# Patient Record
Sex: MALE | Race: WHITE | NOT HISPANIC OR LATINO | Employment: OTHER | URBAN - METROPOLITAN AREA
[De-identification: names, ages, dates, MRNs, and addresses within clinical notes are randomized per-mention and may not be internally consistent; named-entity substitution may affect disease eponyms.]

---

## 2017-08-07 ENCOUNTER — ALLSCRIPTS OFFICE VISIT (OUTPATIENT)
Dept: OTHER | Facility: OTHER | Age: 67
End: 2017-08-07

## 2017-08-07 ENCOUNTER — HOSPITAL ENCOUNTER (OUTPATIENT)
Dept: RADIOLOGY | Facility: HOSPITAL | Age: 67
Discharge: HOME | End: 2017-08-07
Attending: ORTHOPAEDIC SURGERY
Payer: COMMERCIAL

## 2017-08-07 DIAGNOSIS — M25.519 PAIN IN SHOULDER: ICD-10-CM

## 2017-08-07 PROCEDURE — 73030 X-RAY EXAM OF SHOULDER: CPT

## 2018-01-12 VITALS
DIASTOLIC BLOOD PRESSURE: 80 MMHG | BODY MASS INDEX: 33.82 KG/M2 | HEART RATE: 101 BPM | HEIGHT: 67 IN | SYSTOLIC BLOOD PRESSURE: 127 MMHG | WEIGHT: 215.5 LBS

## 2018-03-02 ENCOUNTER — TELEPHONE (OUTPATIENT)
Dept: OBGYN CLINIC | Facility: HOSPITAL | Age: 68
End: 2018-03-02
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
## 2018-03-05 NOTE — TELEPHONE ENCOUNTER
Spoke with Elsy Thomas and was advised to proceed with getting records to Dr mSooth Knight or Dr Mati Silva to review  [facility] [place] [address] left for her to return my call  Awaiting callback

## 2018-03-05 NOTE — TELEPHONE ENCOUNTER
I spoke with Sheila Parker from [facility] and she will work on getting patient's records couriered to our [place] office for Dr Juan Pereira to review asap  Sheila Parker advised that a tentative appt for 3/20 was made for this patient pending receipt of records  She verbalized understanding and will call our office with information on records

## 2018-03-13 NOTE — TELEPHONE ENCOUNTER
Sheila Parker from [facility] informed that records have not been received as of yet  Toña sent them over 1 week ago via   She will check with  and get back to us  She may have to resend records  Toña's contact number is [phone]

## 2018-03-14 ENCOUNTER — TELEPHONE (OUTPATIENT)
Dept: OBGYN CLINIC | Facility: HOSPITAL | Age: 68
End: 2018-03-14

## 2018-03-14 NOTE — TELEPHONE ENCOUNTER
Patient coming in for appt on 3/20 with Dr Viktoriya Slater  Patient also has an L2 compression Fx  Neurosurgery said that patient does not need to follow up with them for this  They are wondering if Dr Viktoriya Slater can see him for this as well  Please advise

## 2018-03-19 ENCOUNTER — TELEPHONE (OUTPATIENT)
Dept: PAIN MEDICINE | Facility: MEDICAL CENTER | Age: 68
End: 2018-03-19

## 2018-03-19 NOTE — TELEPHONE ENCOUNTER
Haleigh Albarran from [facility] called to set up pt for a  consult  Intake done waiting on order either  being faxed to Mame Forrest or uploaded in epic  and waiting to see if pt saw previous pain management  Pt was in therapy and дмитрий was not sure    # [phone]

## 2018-03-20 ENCOUNTER — OFFICE VISIT (OUTPATIENT)
Dept: OBGYN CLINIC | Facility: HOSPITAL | Age: 68
End: 2018-03-20
Payer: OTHER MISCELLANEOUS

## 2018-03-20 ENCOUNTER — HOSPITAL ENCOUNTER (OUTPATIENT)
Dept: RADIOLOGY | Facility: HOSPITAL | Age: 68
Discharge: HOME | End: 2018-03-20
Attending: ORTHOPAEDIC SURGERY
Payer: OTHER MISCELLANEOUS

## 2018-03-20 ENCOUNTER — TRANSCRIBE ORDERS (OUTPATIENT)
Dept: NEUROSURGERY | Facility: CLINIC | Age: 68
End: 2018-03-20

## 2018-03-20 VITALS — HEART RATE: 76 BPM | DIASTOLIC BLOOD PRESSURE: 80 MMHG | SYSTOLIC BLOOD PRESSURE: 138 MMHG

## 2018-03-20 DIAGNOSIS — S82.142D CLOSED FRACTURE OF LEFT TIBIAL PLATEAU WITH ROUTINE HEALING: ICD-10-CM

## 2018-03-20 DIAGNOSIS — M54.5 LOW BACK PAIN, UNSPECIFIED BACK PAIN LATERALITY, UNSPECIFIED CHRONICITY, WITH SCIATICA PRESENCE UNSPECIFIED: Primary | ICD-10-CM

## 2018-03-20 DIAGNOSIS — Z48.89 AFTERCARE FOLLOWING SURGERY: ICD-10-CM

## 2018-03-20 DIAGNOSIS — Z48.89 AFTERCARE FOLLOWING SURGERY: Primary | ICD-10-CM

## 2018-03-20 PROCEDURE — 73590 X-RAY EXAM OF LOWER LEG: CPT

## 2018-03-20 PROCEDURE — 99213 OFFICE O/P EST LOW 20 MIN: CPT | Performed by: ORTHOPAEDIC SURGERY

## 2018-03-20 RX ORDER — BIOTIN 1 MG
TABLET ORAL
COMMUNITY
End: 2020-07-06

## 2018-03-20 RX ORDER — THIAMINE MONONITRATE (VIT B1) 100 MG
100 TABLET ORAL DAILY
COMMUNITY
End: 2020-07-06

## 2018-03-20 RX ORDER — ASPIRIN 81 MG/1
81 TABLET ORAL DAILY
COMMUNITY
End: 2020-07-06

## 2018-03-20 RX ORDER — DOCUSATE SODIUM 100 MG/1
100 CAPSULE, LIQUID FILLED ORAL 2 TIMES DAILY
COMMUNITY
End: 2020-07-06

## 2018-03-20 RX ORDER — DIPHENHYDRAMINE HCL 25 MG
25 TABLET ORAL EVERY 6 HOURS PRN
COMMUNITY
End: 2020-07-06

## 2018-03-20 RX ORDER — DIPHENOXYLATE HYDROCHLORIDE AND ATROPINE SULFATE 2.5; .025 MG/1; MG/1
1 TABLET ORAL DAILY
COMMUNITY

## 2018-03-20 RX ORDER — METOPROLOL TARTRATE 50 MG/1
50 TABLET, FILM COATED ORAL DAILY
Refills: 3 | COMMUNITY
Start: 2018-01-28 | End: 2020-07-06

## 2018-03-20 RX ORDER — FOLIC ACID 1 MG/1
TABLET ORAL DAILY
COMMUNITY
End: 2020-07-06

## 2018-03-20 RX ORDER — ASCORBIC ACID 500 MG
500 TABLET ORAL DAILY
COMMUNITY
End: 2020-07-06

## 2018-03-26 ENCOUNTER — OFFICE VISIT (OUTPATIENT)
Dept: NEUROSURGERY | Facility: CLINIC | Age: 68
End: 2018-03-26
Payer: OTHER MISCELLANEOUS

## 2018-03-26 VITALS — SYSTOLIC BLOOD PRESSURE: 120 MMHG | HEIGHT: 68 IN | TEMPERATURE: 99.1 F | DIASTOLIC BLOOD PRESSURE: 80 MMHG

## 2018-03-26 DIAGNOSIS — S32.010A CLOSED COMPRESSION FRACTURE OF FIRST LUMBAR VERTEBRA, INITIAL ENCOUNTER: ICD-10-CM

## 2018-03-26 PROCEDURE — 99243 OFF/OP CNSLTJ NEW/EST LOW 30: CPT | Performed by: PHYSICIAN ASSISTANT

## 2018-03-26 RX ORDER — AMLODIPINE BESYLATE 5 MG/1
5 TABLET ORAL DAILY
COMMUNITY
End: 2020-12-24

## 2018-03-26 RX ORDER — SENNA PLUS 8.6 MG/1
1 TABLET ORAL DAILY
COMMUNITY
End: 2018-04-10
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
## 2018-03-26 NOTE — PATIENT INSTRUCTIONS
Have Thoracolumbar spine xray completed over next week and follow up in office after xray completed  (If Thoracolumbar spine X-ray is completed at facility outside the [address] then you will need to bring a CD with the thoracolumbar xray images on it to your follow up appointment)  Recommend patient refrain from strenuous activity  Recommend patient refrain from bending / twisting back  Recommend patient limit lifting, pulling, and pushing to no more then 10 lbs  Recommend patient take fall precautions and refrain from activity that increases chance of falling or injury to back
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
## 2018-04-03 ENCOUNTER — TELEPHONE (OUTPATIENT)
Dept: NEUROSURGERY | Facility: CLINIC | Age: 68
End: 2018-04-03

## 2018-04-03 NOTE — TELEPHONE ENCOUNTER
Lmom (home phone)  for patient to get his Xray Thoracolumbar completed or he'd need to be rescheduled  4/9/18: Yoav yoon in [place] to see if Haim Ruvalcaba got his Xray T-Spine completed waiting for a call back from medical records   says Alexis Mejia was discharged from [facility]

## 2018-04-10 ENCOUNTER — OFFICE VISIT (OUTPATIENT)
Dept: NEUROSURGERY | Facility: CLINIC | Age: 68
End: 2018-04-10
Payer: OTHER MISCELLANEOUS

## 2018-04-10 VITALS
TEMPERATURE: 98.5 F | HEIGHT: 68 IN | SYSTOLIC BLOOD PRESSURE: 115 MMHG | DIASTOLIC BLOOD PRESSURE: 70 MMHG | RESPIRATION RATE: 15 BRPM

## 2018-04-10 DIAGNOSIS — S32.010D CLOSED COMPRESSION FRACTURE OF L1 LUMBAR VERTEBRA WITH ROUTINE HEALING, SUBSEQUENT ENCOUNTER: Primary | ICD-10-CM

## 2018-04-10 DIAGNOSIS — M81.0 OSTEOPOROSIS, UNSPECIFIED OSTEOPOROSIS TYPE, UNSPECIFIED PATHOLOGICAL FRACTURE PRESENCE: ICD-10-CM

## 2018-04-10 PROCEDURE — 99213 OFFICE O/P EST LOW 20 MIN: CPT | Performed by: PHYSICIAN ASSISTANT

## 2018-04-10 NOTE — PATIENT INSTRUCTIONS
Patient may gradually increase his activity as tolerated from neurosurgical standpoint although suggest patient take precautions to avoid activity that increases chance of fall or injury to back  Further neurosurgical follow up may be on an as needed basis  Recommend patient follow up with Dr Daniela Brush (Primary Care Provider) for evaluation / management of possible osteoporosis  Patient advised to contact our office or present to hospital Emergency Room if experience worsening or new back pain, sensory or motor change, bladder or bowel dysfunction, or other neurological change
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
## 2018-04-24 ENCOUNTER — OFFICE VISIT (OUTPATIENT)
Dept: OBGYN CLINIC | Facility: HOSPITAL | Age: 68
End: 2018-04-24
Payer: OTHER MISCELLANEOUS

## 2018-04-24 ENCOUNTER — HOSPITAL ENCOUNTER (OUTPATIENT)
Dept: RADIOLOGY | Facility: HOSPITAL | Age: 68
Discharge: HOME | End: 2018-04-24
Attending: ORTHOPAEDIC SURGERY
Payer: OTHER MISCELLANEOUS

## 2018-04-24 VITALS — HEART RATE: 71 BPM | DIASTOLIC BLOOD PRESSURE: 77 MMHG | SYSTOLIC BLOOD PRESSURE: 129 MMHG

## 2018-04-24 DIAGNOSIS — M79.605 LEFT LEG PAIN: Primary | ICD-10-CM

## 2018-04-24 DIAGNOSIS — M79.605 LEFT LEG PAIN: ICD-10-CM

## 2018-04-24 DIAGNOSIS — S82.142D CLOSED FRACTURE OF LEFT TIBIAL PLATEAU WITH ROUTINE HEALING: ICD-10-CM

## 2018-04-24 PROCEDURE — 73590 X-RAY EXAM OF LOWER LEG: CPT

## 2018-04-24 PROCEDURE — 99213 OFFICE O/P EST LOW 20 MIN: CPT | Performed by: ORTHOPAEDIC SURGERY
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
## 2018-05-22 ENCOUNTER — OFFICE VISIT (OUTPATIENT)
Dept: OBGYN CLINIC | Facility: HOSPITAL | Age: 68
End: 2018-05-22
Payer: OTHER MISCELLANEOUS

## 2018-05-22 ENCOUNTER — HOSPITAL ENCOUNTER (OUTPATIENT)
Dept: RADIOLOGY | Facility: HOSPITAL | Age: 68
Discharge: HOME | End: 2018-05-22
Attending: ORTHOPAEDIC SURGERY
Payer: OTHER MISCELLANEOUS

## 2018-05-22 VITALS — SYSTOLIC BLOOD PRESSURE: 126 MMHG | DIASTOLIC BLOOD PRESSURE: 80 MMHG | HEART RATE: 80 BPM

## 2018-05-22 DIAGNOSIS — S82.142D CLOSED FRACTURE OF LEFT TIBIAL PLATEAU WITH ROUTINE HEALING: ICD-10-CM

## 2018-05-22 DIAGNOSIS — S82.142D CLOSED FRACTURE OF LEFT TIBIAL PLATEAU WITH ROUTINE HEALING: Primary | ICD-10-CM

## 2018-05-22 PROCEDURE — 73590 X-RAY EXAM OF LOWER LEG: CPT

## 2018-05-22 PROCEDURE — 99213 OFFICE O/P EST LOW 20 MIN: CPT | Performed by: ORTHOPAEDIC SURGERY

## 2018-06-21 ENCOUNTER — APPOINTMENT (OUTPATIENT)
Dept: RADIOLOGY | Facility: CLINIC | Age: 68
End: 2018-06-21
Payer: OTHER MISCELLANEOUS

## 2018-06-21 ENCOUNTER — OFFICE VISIT (OUTPATIENT)
Dept: OBGYN CLINIC | Facility: CLINIC | Age: 68
End: 2018-06-21
Payer: OTHER MISCELLANEOUS

## 2018-06-21 VITALS — SYSTOLIC BLOOD PRESSURE: 136 MMHG | HEART RATE: 93 BPM | DIASTOLIC BLOOD PRESSURE: 82 MMHG

## 2018-06-21 DIAGNOSIS — Z48.89 AFTERCARE FOLLOWING SURGERY: ICD-10-CM

## 2018-06-21 DIAGNOSIS — Z48.89 AFTERCARE FOLLOWING SURGERY: Primary | ICD-10-CM

## 2018-06-21 PROCEDURE — 99213 OFFICE O/P EST LOW 20 MIN: CPT | Performed by: ORTHOPAEDIC SURGERY

## 2018-06-21 PROCEDURE — 73590 X-RAY EXAM OF LOWER LEG: CPT
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
## 2018-08-02 ENCOUNTER — HOSPITAL ENCOUNTER (OUTPATIENT)
Dept: RADIOLOGY | Facility: HOSPITAL | Age: 68
Discharge: HOME | End: 2018-08-02
Attending: ORTHOPAEDIC SURGERY
Payer: OTHER MISCELLANEOUS

## 2018-08-02 ENCOUNTER — OFFICE VISIT (OUTPATIENT)
Dept: OBGYN CLINIC | Facility: HOSPITAL | Age: 68
End: 2018-08-02
Payer: OTHER MISCELLANEOUS

## 2018-08-02 VITALS — HEART RATE: 77 BPM | DIASTOLIC BLOOD PRESSURE: 81 MMHG | SYSTOLIC BLOOD PRESSURE: 158 MMHG

## 2018-08-02 DIAGNOSIS — Z48.89 AFTERCARE FOLLOWING SURGERY: ICD-10-CM

## 2018-08-02 DIAGNOSIS — Z48.89 AFTERCARE FOLLOWING SURGERY: Primary | ICD-10-CM

## 2018-08-02 DIAGNOSIS — S82.142D CLOSED FRACTURE OF LEFT TIBIAL PLATEAU WITH ROUTINE HEALING: ICD-10-CM

## 2018-08-02 PROCEDURE — 99213 OFFICE O/P EST LOW 20 MIN: CPT | Performed by: ORTHOPAEDIC SURGERY

## 2018-08-02 PROCEDURE — 73590 X-RAY EXAM OF LOWER LEG: CPT

## 2018-08-02 NOTE — LETTER
August 2, 2018     Patient: Denver Velásquez   YOB: 1950   Date of Visit: 8/2/2018       To Whom it May Concern:    Denver Velásquez is under my professional care  He was seen in my office on 8/2/2018  He may return to work on 8/2/18 with full duty activities regarding his knee  If you have any questions or concerns, please don't hesitate to call           Sincerely,          Erna Morel MD        CC: No Recipients
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
## 2020-05-11 RX ORDER — METOPROLOL TARTRATE 50 MG/1
50 TABLET, FILM COATED ORAL DAILY
Qty: 30 TABLET | Refills: 0 | OUTPATIENT
Start: 2020-05-11

## 2020-07-02 PROBLEM — R26.2 AMBULATORY DYSFUNCTION: Status: ACTIVE | Noted: 2018-04-02

## 2020-07-02 PROBLEM — M41.26 IDIOPATHIC SCOLIOSIS OF LUMBAR SPINE: Status: ACTIVE | Noted: 2018-04-02

## 2020-07-02 PROBLEM — I10 ESSENTIAL HYPERTENSION: Status: ACTIVE | Noted: 2018-04-02

## 2020-07-02 PROBLEM — F33.40 RECURRENT MAJOR DEPRESSIVE DISORDER, IN REMISSION (HCC): Status: ACTIVE | Noted: 2018-04-20

## 2020-07-02 PROBLEM — F10.20 ALCOHOL DEPENDENCE (HCC): Status: ACTIVE | Noted: 2018-04-02

## 2020-07-02 PROBLEM — G47.09 OTHER INSOMNIA: Status: ACTIVE | Noted: 2018-04-02

## 2020-07-06 ENCOUNTER — OFFICE VISIT (OUTPATIENT)
Dept: FAMILY MEDICINE CLINIC | Facility: CLINIC | Age: 70
End: 2020-07-06
Payer: MEDICARE

## 2020-07-06 VITALS
WEIGHT: 195.2 LBS | HEIGHT: 66 IN | DIASTOLIC BLOOD PRESSURE: 60 MMHG | BODY MASS INDEX: 31.37 KG/M2 | SYSTOLIC BLOOD PRESSURE: 100 MMHG | TEMPERATURE: 98 F | HEART RATE: 80 BPM

## 2020-07-06 DIAGNOSIS — Z12.11 SCREENING FOR COLON CANCER: ICD-10-CM

## 2020-07-06 DIAGNOSIS — I10 ESSENTIAL HYPERTENSION: Primary | ICD-10-CM

## 2020-07-06 DIAGNOSIS — Z12.11 ENCOUNTER FOR SCREENING COLONOSCOPY: ICD-10-CM

## 2020-07-06 DIAGNOSIS — F33.40 RECURRENT MAJOR DEPRESSIVE DISORDER, IN REMISSION (HCC): ICD-10-CM

## 2020-07-06 DIAGNOSIS — E66.9 OBESITY (BMI 30-39.9): ICD-10-CM

## 2020-07-06 DIAGNOSIS — Z12.5 SCREENING FOR PROSTATE CANCER: ICD-10-CM

## 2020-07-06 DIAGNOSIS — F10.21 ALCOHOL DEPENDENCE IN REMISSION (HCC): ICD-10-CM

## 2020-07-06 DIAGNOSIS — Z23 ENCOUNTER FOR IMMUNIZATION: ICD-10-CM

## 2020-07-06 DIAGNOSIS — E78.5 HYPERLIPIDEMIA, UNSPECIFIED HYPERLIPIDEMIA TYPE: ICD-10-CM

## 2020-07-06 DIAGNOSIS — S32.010D CLOSED COMPRESSION FRACTURE OF L1 LUMBAR VERTEBRA WITH ROUTINE HEALING, SUBSEQUENT ENCOUNTER: ICD-10-CM

## 2020-07-06 DIAGNOSIS — S82.142D CLOSED FRACTURE OF LEFT TIBIAL PLATEAU WITH ROUTINE HEALING: ICD-10-CM

## 2020-07-06 PROBLEM — R26.2 AMBULATORY DYSFUNCTION: Status: RESOLVED | Noted: 2018-04-02 | Resolved: 2020-07-06

## 2020-07-06 PROBLEM — S32.010A CLOSED COMPRESSION FRACTURE OF L1 VERTEBRA (HCC): Status: RESOLVED | Noted: 2018-03-12 | Resolved: 2020-07-06

## 2020-07-06 PROCEDURE — 1036F TOBACCO NON-USER: CPT | Performed by: FAMILY MEDICINE

## 2020-07-06 PROCEDURE — 3074F SYST BP LT 130 MM HG: CPT | Performed by: FAMILY MEDICINE

## 2020-07-06 PROCEDURE — 3078F DIAST BP <80 MM HG: CPT | Performed by: FAMILY MEDICINE

## 2020-07-06 PROCEDURE — 3008F BODY MASS INDEX DOCD: CPT | Performed by: FAMILY MEDICINE

## 2020-07-06 PROCEDURE — 1160F RVW MEDS BY RX/DR IN RCRD: CPT | Performed by: FAMILY MEDICINE

## 2020-07-06 PROCEDURE — 99204 OFFICE O/P NEW MOD 45 MIN: CPT | Performed by: FAMILY MEDICINE

## 2020-07-06 RX ORDER — METOPROLOL SUCCINATE 50 MG/1
50 TABLET, EXTENDED RELEASE ORAL DAILY
COMMUNITY
End: 2021-03-12 | Stop reason: SDUPTHER

## 2020-07-06 NOTE — PATIENT INSTRUCTIONS
Complete blood work next week  Diet exercise weight loss recommended  Complete colonoscopy as ordered  Patient return in 4 weeks for office visit, blood work results, and EKG
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
## 2020-07-20 ENCOUNTER — APPOINTMENT (OUTPATIENT)
Dept: LAB | Facility: HOSPITAL | Age: 70
End: 2020-07-20
Payer: MEDICARE

## 2020-07-20 DIAGNOSIS — Z12.5 SCREENING FOR PROSTATE CANCER: ICD-10-CM

## 2020-07-20 DIAGNOSIS — E78.5 HYPERLIPIDEMIA, UNSPECIFIED HYPERLIPIDEMIA TYPE: ICD-10-CM

## 2020-07-20 DIAGNOSIS — E66.9 OBESITY (BMI 30-39.9): ICD-10-CM

## 2020-07-20 DIAGNOSIS — I10 ESSENTIAL HYPERTENSION: ICD-10-CM

## 2020-07-20 DIAGNOSIS — F33.40 RECURRENT MAJOR DEPRESSIVE DISORDER, IN REMISSION (HCC): ICD-10-CM

## 2020-07-20 DIAGNOSIS — F10.21 ALCOHOL DEPENDENCE IN REMISSION (HCC): ICD-10-CM

## 2020-07-20 DIAGNOSIS — S32.010D CLOSED COMPRESSION FRACTURE OF L1 LUMBAR VERTEBRA WITH ROUTINE HEALING, SUBSEQUENT ENCOUNTER: ICD-10-CM

## 2020-07-20 DIAGNOSIS — S82.142D CLOSED FRACTURE OF LEFT TIBIAL PLATEAU WITH ROUTINE HEALING: ICD-10-CM

## 2020-07-20 DIAGNOSIS — Z12.11 SCREENING FOR COLON CANCER: ICD-10-CM

## 2020-07-20 LAB
ALBUMIN SERPL BCP-MCNC: 3.7 G/DL (ref 3.5–5)
ALP SERPL-CCNC: 45 U/L (ref 46–116)
ALT SERPL W P-5'-P-CCNC: 25 U/L (ref 12–78)
ANION GAP SERPL CALCULATED.3IONS-SCNC: 9 MMOL/L (ref 4–13)
AST SERPL W P-5'-P-CCNC: 10 U/L (ref 5–45)
BILIRUB SERPL-MCNC: 0.58 MG/DL (ref 0.2–1)
BILIRUB UR QL STRIP: NEGATIVE
BUN SERPL-MCNC: 18 MG/DL (ref 5–25)
CALCIUM SERPL-MCNC: 9.2 MG/DL (ref 8.3–10.1)
CHLORIDE SERPL-SCNC: 102 MMOL/L (ref 100–108)
CHOLEST SERPL-MCNC: 218 MG/DL (ref 50–200)
CLARITY UR: CLEAR
CO2 SERPL-SCNC: 25 MMOL/L (ref 21–32)
COLOR UR: YELLOW
CREAT SERPL-MCNC: 0.88 MG/DL (ref 0.6–1.3)
ERYTHROCYTE [DISTWIDTH] IN BLOOD BY AUTOMATED COUNT: 12.3 % (ref 11.6–15.1)
GFR SERPL CREATININE-BSD FRML MDRD: 88 ML/MIN/1.73SQ M
GLUCOSE P FAST SERPL-MCNC: 104 MG/DL (ref 65–99)
GLUCOSE UR STRIP-MCNC: NEGATIVE MG/DL
HCT VFR BLD AUTO: 41.3 % (ref 36.5–49.3)
HDLC SERPL-MCNC: 59 MG/DL
HGB BLD-MCNC: 13.9 G/DL (ref 12–17)
HGB UR QL STRIP.AUTO: NEGATIVE
KETONES UR STRIP-MCNC: NEGATIVE MG/DL
LDLC SERPL CALC-MCNC: 136 MG/DL (ref 0–100)
LEUKOCYTE ESTERASE UR QL STRIP: NEGATIVE
MCH RBC QN AUTO: 31.7 PG (ref 26.8–34.3)
MCHC RBC AUTO-ENTMCNC: 33.7 G/DL (ref 31.4–37.4)
MCV RBC AUTO: 94 FL (ref 82–98)
NITRITE UR QL STRIP: NEGATIVE
PH UR STRIP.AUTO: 6 [PH]
PLATELET # BLD AUTO: 227 THOUSANDS/UL (ref 149–390)
PMV BLD AUTO: 9.5 FL (ref 8.9–12.7)
POTASSIUM SERPL-SCNC: 3.8 MMOL/L (ref 3.5–5.3)
PROT SERPL-MCNC: 6.8 G/DL (ref 6.4–8.2)
PROT UR STRIP-MCNC: NEGATIVE MG/DL
PSA SERPL-MCNC: 0.8 NG/ML (ref 0–4)
RBC # BLD AUTO: 4.38 MILLION/UL (ref 3.88–5.62)
SODIUM SERPL-SCNC: 136 MMOL/L (ref 136–145)
SP GR UR STRIP.AUTO: 1.01 (ref 1–1.03)
TRIGL SERPL-MCNC: 115 MG/DL
TSH SERPL DL<=0.05 MIU/L-ACNC: 1.88 UIU/ML (ref 0.36–3.74)
UROBILINOGEN UR QL STRIP.AUTO: 1 E.U./DL
WBC # BLD AUTO: 9.5 THOUSAND/UL (ref 4.31–10.16)

## 2020-07-20 PROCEDURE — G0103 PSA SCREENING: HCPCS

## 2020-07-20 PROCEDURE — 85027 COMPLETE CBC AUTOMATED: CPT

## 2020-07-20 PROCEDURE — 80053 COMPREHEN METABOLIC PANEL: CPT

## 2020-07-20 PROCEDURE — 84443 ASSAY THYROID STIM HORMONE: CPT

## 2020-07-20 PROCEDURE — 81003 URINALYSIS AUTO W/O SCOPE: CPT

## 2020-07-20 PROCEDURE — 36415 COLL VENOUS BLD VENIPUNCTURE: CPT

## 2020-07-20 PROCEDURE — 80061 LIPID PANEL: CPT

## 2020-07-21 ENCOUNTER — TELEPHONE (OUTPATIENT)
Dept: OBGYN CLINIC | Facility: HOSPITAL | Age: 70
End: 2020-07-21

## 2020-07-21 NOTE — TELEPHONE ENCOUNTER
Rosemary from [facility] sending a medical records request for the 3rd time since May  Provided f#[phone]  Please forward to [facility]O once received  Thank you

## 2020-07-21 NOTE — TELEPHONE ENCOUNTER
Returned call & advised that request was sent to [facility] in [place] on 07/16

## 2020-07-21 NOTE — TELEPHONE ENCOUNTER
I checked our fax machine and nothing  Do you by chance already have this request? Can you help with this?     Thank you

## 2020-08-06 ENCOUNTER — TELEPHONE (OUTPATIENT)
Dept: FAMILY MEDICINE CLINIC | Facility: CLINIC | Age: 70
End: 2020-08-06

## 2020-08-06 PROBLEM — R73.9 HYPERGLYCEMIA: Status: ACTIVE | Noted: 2020-08-06

## 2020-08-06 NOTE — TELEPHONE ENCOUNTER
This patient did not show for scheduled appointment today, 08/06/2020  He needs to reschedule for 45 minutes appointment    He needs an office visit, EKG, and a [place]

## 2020-09-08 NOTE — TELEPHONE ENCOUNTER
PATIENT CALLED TO QUESTION IMPORTANCE OF HIS OFFICE VISIT ON 09/28/2020, STATES HE WILL NOT BE ABLE TO AFFORD COMING-THERE IS NOTHING WRONG WITH HIS HEART AND HE DOES NOT FEEL HE NEEDS TO BE SEEN  HE HAS A LOT OF DENTAL WORK BEING DONE AND CAN NOT AFFORD TO COME FOR HIS VISIT  PATIENT STATES HE DOES NOT HAVE INSURANCE OTHER THAN THE MEDICARE AND HE CAN NOT AFFORD THE BALANCE OF THE VISITS AND EKG   PATIENT STATES HE WANTS TO CANCEL, APPOINTMENT CANCELLED, PLEASE CALL PATIENT IF HE NEEDS TO BEEN SEEN [phone]

## 2020-10-10 ENCOUNTER — TELEPHONE (OUTPATIENT)
Dept: GASTROENTEROLOGY | Facility: CLINIC | Age: 70
End: 2020-10-10

## 2020-10-19 DIAGNOSIS — I10 ESSENTIAL HYPERTENSION: Primary | ICD-10-CM

## 2020-10-19 RX ORDER — METOPROLOL SUCCINATE 50 MG/1
50 TABLET, EXTENDED RELEASE ORAL DAILY
Qty: 90 TABLET | Refills: 1 | OUTPATIENT
Start: 2020-10-19

## 2020-10-23 ENCOUNTER — OFFICE VISIT (OUTPATIENT)
Dept: FAMILY MEDICINE CLINIC | Facility: CLINIC | Age: 70
End: 2020-10-23
Payer: MEDICARE

## 2020-10-23 VITALS
HEART RATE: 80 BPM | WEIGHT: 192.5 LBS | DIASTOLIC BLOOD PRESSURE: 84 MMHG | SYSTOLIC BLOOD PRESSURE: 134 MMHG | TEMPERATURE: 98.4 F | HEIGHT: 66 IN | BODY MASS INDEX: 30.94 KG/M2

## 2020-10-23 DIAGNOSIS — R44.1 VISUAL HALLUCINATIONS: ICD-10-CM

## 2020-10-23 DIAGNOSIS — R73.9 HYPERGLYCEMIA: ICD-10-CM

## 2020-10-23 DIAGNOSIS — F16.21: ICD-10-CM

## 2020-10-23 DIAGNOSIS — M25.512 CHRONIC PAIN OF BOTH SHOULDERS: ICD-10-CM

## 2020-10-23 DIAGNOSIS — G89.29 CHRONIC PAIN OF BOTH SHOULDERS: ICD-10-CM

## 2020-10-23 DIAGNOSIS — E78.2 MIXED HYPERLIPIDEMIA: ICD-10-CM

## 2020-10-23 DIAGNOSIS — I10 ESSENTIAL HYPERTENSION: Primary | ICD-10-CM

## 2020-10-23 DIAGNOSIS — M25.511 CHRONIC PAIN OF BOTH SHOULDERS: ICD-10-CM

## 2020-10-23 DIAGNOSIS — E66.9 OBESITY (BMI 30-39.9): ICD-10-CM

## 2020-10-23 DIAGNOSIS — Z12.11 SCREENING FOR COLON CANCER: ICD-10-CM

## 2020-10-23 DIAGNOSIS — F10.21 ALCOHOL DEPENDENCE IN REMISSION (HCC): ICD-10-CM

## 2020-10-23 DIAGNOSIS — Z00.00 HEALTH CARE MAINTENANCE: ICD-10-CM

## 2020-10-23 PROCEDURE — 99214 OFFICE O/P EST MOD 30 MIN: CPT | Performed by: FAMILY MEDICINE

## 2020-10-23 PROCEDURE — 93000 ELECTROCARDIOGRAM COMPLETE: CPT | Performed by: FAMILY MEDICINE

## 2020-10-23 PROCEDURE — G0438 PPPS, INITIAL VISIT: HCPCS | Performed by: FAMILY MEDICINE

## 2020-10-23 RX ORDER — MELATONIN
1000 DAILY
COMMUNITY
End: 2021-09-08

## 2020-10-26 ENCOUNTER — TELEPHONE (OUTPATIENT)
Dept: FAMILY MEDICINE CLINIC | Facility: CLINIC | Age: 70
End: 2020-10-26

## 2020-10-26 DIAGNOSIS — M54.50 CHRONIC MIDLINE LOW BACK PAIN WITHOUT SCIATICA: Primary | ICD-10-CM

## 2020-10-26 DIAGNOSIS — G89.29 CHRONIC MIDLINE LOW BACK PAIN WITHOUT SCIATICA: Primary | ICD-10-CM

## 2020-10-28 PROBLEM — M54.50 CHRONIC MIDLINE LOW BACK PAIN WITHOUT SCIATICA: Status: ACTIVE | Noted: 2020-10-28

## 2020-10-28 PROBLEM — G89.29 CHRONIC MIDLINE LOW BACK PAIN WITHOUT SCIATICA: Status: ACTIVE | Noted: 2020-10-28

## 2020-11-06 ENCOUNTER — TELEPHONE (OUTPATIENT)
Dept: FAMILY MEDICINE CLINIC | Facility: CLINIC | Age: 70
End: 2020-11-06

## 2020-11-28 ENCOUNTER — TELEPHONE (OUTPATIENT)
Dept: FAMILY MEDICINE CLINIC | Facility: CLINIC | Age: 70
End: 2020-11-28

## 2020-12-24 ENCOUNTER — OFFICE VISIT (OUTPATIENT)
Dept: FAMILY MEDICINE CLINIC | Facility: CLINIC | Age: 70
End: 2020-12-24
Payer: MEDICARE

## 2020-12-24 VITALS
HEART RATE: 80 BPM | TEMPERATURE: 97.6 F | WEIGHT: 189 LBS | SYSTOLIC BLOOD PRESSURE: 130 MMHG | HEIGHT: 66 IN | RESPIRATION RATE: 16 BRPM | DIASTOLIC BLOOD PRESSURE: 80 MMHG | BODY MASS INDEX: 30.37 KG/M2

## 2020-12-24 DIAGNOSIS — R46.4 SLOWNESS AND POOR RESPONSIVENESS: ICD-10-CM

## 2020-12-24 DIAGNOSIS — R29.6 MULTIPLE FALLS: ICD-10-CM

## 2020-12-24 DIAGNOSIS — K11.7 DROOLING: ICD-10-CM

## 2020-12-24 DIAGNOSIS — F40.240 CLAUSTROPHOBIA: ICD-10-CM

## 2020-12-24 DIAGNOSIS — R32 URINARY INCONTINENCE, UNSPECIFIED TYPE: ICD-10-CM

## 2020-12-24 DIAGNOSIS — R41.3 MEMORY CHANGES: ICD-10-CM

## 2020-12-24 DIAGNOSIS — F32.A ANXIETY AND DEPRESSION: ICD-10-CM

## 2020-12-24 DIAGNOSIS — R25.1 TREMOR: ICD-10-CM

## 2020-12-24 DIAGNOSIS — R44.1 VISUAL HALLUCINATIONS: Primary | ICD-10-CM

## 2020-12-24 DIAGNOSIS — F16.21: ICD-10-CM

## 2020-12-24 DIAGNOSIS — I10 ESSENTIAL HYPERTENSION: ICD-10-CM

## 2020-12-24 DIAGNOSIS — F41.9 ANXIETY AND DEPRESSION: ICD-10-CM

## 2020-12-24 DIAGNOSIS — F10.21 ALCOHOL DEPENDENCE IN REMISSION (HCC): ICD-10-CM

## 2020-12-24 PROCEDURE — 99215 OFFICE O/P EST HI 40 MIN: CPT | Performed by: FAMILY MEDICINE

## 2020-12-24 RX ORDER — ALPRAZOLAM 0.25 MG/1
TABLET ORAL
Qty: 1 TABLET | Refills: 0 | Status: SHIPPED | OUTPATIENT
Start: 2020-12-24 | End: 2021-03-23

## 2020-12-28 ENCOUNTER — LAB (OUTPATIENT)
Dept: LAB | Facility: CLINIC | Age: 70
End: 2020-12-28
Payer: MEDICARE

## 2020-12-28 ENCOUNTER — TELEPHONE (OUTPATIENT)
Dept: GERIATRICS | Age: 70
End: 2020-12-28

## 2020-12-28 DIAGNOSIS — F10.21 ALCOHOL DEPENDENCE IN REMISSION (HCC): ICD-10-CM

## 2020-12-28 DIAGNOSIS — F41.9 ANXIETY AND DEPRESSION: ICD-10-CM

## 2020-12-28 DIAGNOSIS — R32 URINARY INCONTINENCE, UNSPECIFIED TYPE: ICD-10-CM

## 2020-12-28 DIAGNOSIS — F32.A ANXIETY AND DEPRESSION: ICD-10-CM

## 2020-12-28 DIAGNOSIS — R25.1 TREMOR: ICD-10-CM

## 2020-12-28 DIAGNOSIS — K11.7 DROOLING: ICD-10-CM

## 2020-12-28 DIAGNOSIS — R29.6 MULTIPLE FALLS: ICD-10-CM

## 2020-12-28 DIAGNOSIS — I10 ESSENTIAL HYPERTENSION: ICD-10-CM

## 2020-12-28 DIAGNOSIS — R46.4 SLOWNESS AND POOR RESPONSIVENESS: ICD-10-CM

## 2020-12-28 DIAGNOSIS — R44.1 VISUAL HALLUCINATIONS: ICD-10-CM

## 2020-12-28 LAB
BILIRUB UR QL STRIP: NEGATIVE
CLARITY UR: CLEAR
COLOR UR: YELLOW
FOLATE SERPL-MCNC: >20 NG/ML (ref 3.1–17.5)
GLUCOSE UR STRIP-MCNC: NEGATIVE MG/DL
HGB UR QL STRIP.AUTO: NEGATIVE
KETONES UR STRIP-MCNC: NEGATIVE MG/DL
LEUKOCYTE ESTERASE UR QL STRIP: NEGATIVE
NITRITE UR QL STRIP: NEGATIVE
PH UR STRIP.AUTO: 6 [PH]
PROT UR STRIP-MCNC: NEGATIVE MG/DL
RPR SER QL: NORMAL
SP GR UR STRIP.AUTO: 1.02 (ref 1–1.03)
UROBILINOGEN UR QL STRIP.AUTO: 0.2 E.U./DL
VIT B12 SERPL-MCNC: 794 PG/ML (ref 100–900)

## 2020-12-28 PROCEDURE — 86592 SYPHILIS TEST NON-TREP QUAL: CPT

## 2020-12-28 PROCEDURE — 86618 LYME DISEASE ANTIBODY: CPT

## 2020-12-28 PROCEDURE — 36415 COLL VENOUS BLD VENIPUNCTURE: CPT

## 2020-12-28 PROCEDURE — 83655 ASSAY OF LEAD: CPT

## 2020-12-28 PROCEDURE — 82300 ASSAY OF CADMIUM: CPT

## 2020-12-28 PROCEDURE — 82746 ASSAY OF FOLIC ACID SERUM: CPT

## 2020-12-28 PROCEDURE — 83825 ASSAY OF MERCURY: CPT

## 2020-12-28 PROCEDURE — 82607 VITAMIN B-12: CPT

## 2020-12-28 PROCEDURE — 87086 URINE CULTURE/COLONY COUNT: CPT

## 2020-12-28 PROCEDURE — 81003 URINALYSIS AUTO W/O SCOPE: CPT

## 2020-12-28 PROCEDURE — 82175 ASSAY OF ARSENIC: CPT

## 2020-12-29 LAB
B BURGDOR IGG+IGM SER-ACNC: 1
BACTERIA UR CULT: NORMAL

## 2020-12-30 ENCOUNTER — HOSPITAL ENCOUNTER (OUTPATIENT)
Dept: NEUROLOGY | Facility: HOSPITAL | Age: 70
Discharge: HOME | End: 2020-12-30
Payer: MEDICARE

## 2020-12-30 DIAGNOSIS — R44.1 VISUAL HALLUCINATIONS: ICD-10-CM

## 2020-12-30 DIAGNOSIS — R46.4 SLOWNESS AND POOR RESPONSIVENESS: ICD-10-CM

## 2020-12-30 LAB
ARSENIC BLD-MCNC: 7 UG/L (ref 2–23)
CADMIUM BLD-MCNC: <0.5 UG/L (ref 0–1.2)
LEAD BLD-MCNC: <1 UG/DL (ref 0–4)
MERCURY BLD-MCNC: 1.2 UG/L (ref 0–14.9)

## 2020-12-30 PROCEDURE — 95816 EEG AWAKE AND DROWSY: CPT | Performed by: STUDENT IN AN ORGANIZED HEALTH CARE EDUCATION/TRAINING PROGRAM

## 2020-12-30 PROCEDURE — 95816 EEG AWAKE AND DROWSY: CPT

## 2021-02-02 ENCOUNTER — TELEPHONE (OUTPATIENT)
Dept: OTHER | Facility: OTHER | Age: 71
End: 2021-02-02

## 2021-03-09 ENCOUNTER — TELEPHONE (OUTPATIENT)
Dept: PSYCHIATRY | Facility: CLINIC | Age: 71
End: 2021-03-09

## 2021-03-10 ENCOUNTER — TELEPHONE (OUTPATIENT)
Dept: BEHAVIORAL/MENTAL HEALTH CLINIC | Facility: CLINIC | Age: 71
End: 2021-03-10

## 2021-03-10 DIAGNOSIS — Z23 ENCOUNTER FOR IMMUNIZATION: ICD-10-CM

## 2021-03-10 NOTE — TELEPHONE ENCOUNTER
Ashvin Anderson called to set up an apt for Romain Meyers she asked if you can call before 1:00 or after 4:00 that's when she will be available thank you

## 2021-03-11 ENCOUNTER — TELEPHONE (OUTPATIENT)
Dept: PSYCHIATRY | Facility: CLINIC | Age: 71
End: 2021-03-11

## 2021-03-11 ENCOUNTER — TELEPHONE (OUTPATIENT)
Dept: FAMILY MEDICINE CLINIC | Facility: CLINIC | Age: 71
End: 2021-03-11

## 2021-03-11 ENCOUNTER — HOSPITAL ENCOUNTER (EMERGENCY)
Facility: HOSPITAL | Age: 71
Discharge: HOME | End: 2021-03-11
Attending: EMERGENCY MEDICINE
Payer: MEDICARE

## 2021-03-11 VITALS
RESPIRATION RATE: 18 BRPM | SYSTOLIC BLOOD PRESSURE: 128 MMHG | HEART RATE: 104 BPM | DIASTOLIC BLOOD PRESSURE: 77 MMHG | TEMPERATURE: 98.6 F | OXYGEN SATURATION: 96 %

## 2021-03-11 DIAGNOSIS — K59.00 CONSTIPATION: Primary | ICD-10-CM

## 2021-03-11 DIAGNOSIS — K56.41 FECAL IMPACTION IN RECTUM (HCC): ICD-10-CM

## 2021-03-11 PROBLEM — U07.1 COVID-19 VIRUS INFECTION: Status: ACTIVE | Noted: 2021-03-03

## 2021-03-11 PROCEDURE — 99283 EMERGENCY DEPT VISIT LOW MDM: CPT

## 2021-03-11 PROCEDURE — 99284 EMERGENCY DEPT VISIT MOD MDM: CPT | Performed by: PHYSICIAN ASSISTANT

## 2021-03-11 RX ORDER — MINERAL OIL 100 G/100G
1 OIL RECTAL ONCE
Status: COMPLETED | OUTPATIENT
Start: 2021-03-11 | End: 2021-03-11

## 2021-03-11 RX ADMIN — MINERAL OIL 1 ENEMA: 100 ENEMA RECTAL at 11:48
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
## 2021-03-11 NOTE — TELEPHONE ENCOUNTER
Pt called at 9:49; he wanted to know if something could be ordered for him before noon because it is "bull s* * **" that he has to wait  I explained to him that the doctor can't order anything without an appointment  I did not know that a message was sent back when he first made his appointment with us  I offered to give him an earlier virtual appointment with a different provider, or he can do an urgent care or emergency room  He chose to go to the emergency room  I confirmed that he wanted me to cancel this appointment, and he did  He said he is going to go to the emergency room

## 2021-03-11 NOTE — DISCHARGE INSTRUCTIONS
Continue regular stool/constipation regimen  Follow-up with PCP for monitoring of symptoms  Return to ED if symptoms worsen including persistent constipation, abdominal pain, nausea, vomiting, fevers

## 2021-03-11 NOTE — TELEPHONE ENCOUNTER
Behavorial Health Outpatient Intake Questions    Referred by: Cole 19    Please advised interviewee that they need to answer all questions truthfully to allow for best care and any misrepresentations of information may affect their ability to be seen at this clinic   => Was this discussed? Yes     [facility] -     Presenting Problem (in patient's words):   Depression, anxiety, suicidal thoughts (not at the moment)  Memory confusion becoming worst for the past years  Are there any developmental disabilities? ? If yes, can they speak to you on the phone? If they are too limited to speak to you on phone, refer out No    Are you taking any psychiatric medications? Yes    => If yes, who prescribes? If yes, are they injectable medications?   sertraline (ZOLOFT) 50 mg tablet       Does the patient have a language barrier or hearing impairment? No    Have you been treated at [facility] by a therapist or a doctor in the past? If yes, who? No    Has the patient been hospitalized for mental health? Yes   If yes, how long ago was last hospitalization and where was it? [address]    Do you actively use alcohol or marijuana or illegal substances? If yes, what and how much - refer out to Drug and alcohol treatment if use is excessive or daily use of illegal substances ; Alcohol (ocasional)  Do you have a community treatment team or ? No    Legal History-     Does the patient have any history of arrests, senior living/jail time, or DUIs? No  If Yes-  1) What types of charges? 2) When were they last incarcerated? 3) Are they currently on parole or probation? Minor Child-    Who has custody of the child? Is there a custody agreement? If there is a custody agreement remind parent that they must bring a copy to the first appt or they will not be seen       Intake Team, please check with provider before scheduling if flags come up such as:  - complex case  - legal history (other than DUI)  - communication barrier concerns are present  - if, in your judgment, this needs further review    ACCEPTED as a patient: Yes  => Appointment Date: Tuesday, May 11th, 2021 at 10:00 a m with Dr Osvaldo Montero  Referred Elsewhere? No    Name of Insurance Co: Medicare A and B  Insurance ID#  Phone #  If ins is primary or secondary  If patient is a minor, parents information such as Name, D  O B of guarantor

## 2021-03-11 NOTE — TELEPHONE ENCOUNTER
PT HAS A VIRTUAL VISIT WITH TS TODAY AT 12:15 BUT IS ASKING IF HE CAN CALL HIM SOON, HE IS SO CONSTIPATED HE REALLY WANTS TO TAKE SOMETHING NOW, HE HAS TAKEN 4 EXLAX AND MIRALAX AND NOW IS ASKING FOR THAT STUFF THEY GIVE YOU WHEN YOU HAVE THE COLONOSCOPY    PLEASE CALL PT [phone]

## 2021-03-12 ENCOUNTER — TELEPHONE (OUTPATIENT)
Dept: PSYCHIATRY | Facility: CLINIC | Age: 71
End: 2021-03-12

## 2021-03-12 ENCOUNTER — TELEPHONE (OUTPATIENT)
Dept: BEHAVIORAL/MENTAL HEALTH CLINIC | Facility: CLINIC | Age: 71
End: 2021-03-12

## 2021-03-12 DIAGNOSIS — I10 ESSENTIAL HYPERTENSION: Primary | ICD-10-CM

## 2021-03-12 RX ORDER — METOPROLOL SUCCINATE 50 MG/1
50 TABLET, EXTENDED RELEASE ORAL DAILY
Qty: 90 TABLET | Refills: 0 | Status: SHIPPED | OUTPATIENT
Start: 2021-03-12 | End: 2021-06-06

## 2021-03-12 NOTE — TELEPHONE ENCOUNTER
PATIENT WALKED IN TO REQUEST REFILL OF HIS METOPROLOL PLEASE  PATIENT PRESCRIBED BY DR BERG ORIGINALLY  PATIENT ASKING FOR 90 DAY PLEASE   PATIENT USES SONIA Durbin

## 2021-03-12 NOTE — TELEPHONE ENCOUNTER
Manuel Galvez called and would like to set up an apt for Naya Coe she said a good time to contact her is between now and 1:00 or after 4:00 can you please give her a call at one of them times thank you

## 2021-03-12 NOTE — TELEPHONE ENCOUNTER
----- Message from Devan Kaye MD sent at 3/11/2021  5:11 PM EST -----  Regarding: RE: New Patient  Julio Lynn: I am fine with seeing him  Thanks   ----- Message -----  From: Rosamaria Blood  Sent: 3/11/2021   4:28 PM EST  To: Devan Kaye MD  Subject: New Patient                                      Good afternoon Dr Carmina Anderson,    Would you please review intake and advise if appropriate to schedule with you? Thank you!

## 2021-03-23 ENCOUNTER — OFFICE VISIT (OUTPATIENT)
Dept: FAMILY MEDICINE CLINIC | Facility: CLINIC | Age: 71
End: 2021-03-23
Payer: MEDICARE

## 2021-03-23 ENCOUNTER — TELEPHONE (OUTPATIENT)
Dept: NEUROLOGY | Facility: CLINIC | Age: 71
End: 2021-03-23

## 2021-03-23 VITALS
TEMPERATURE: 98.3 F | DIASTOLIC BLOOD PRESSURE: 70 MMHG | SYSTOLIC BLOOD PRESSURE: 114 MMHG | WEIGHT: 186 LBS | BODY MASS INDEX: 29.89 KG/M2 | HEART RATE: 96 BPM | HEIGHT: 66 IN | OXYGEN SATURATION: 99 %

## 2021-03-23 DIAGNOSIS — E78.2 MIXED HYPERLIPIDEMIA: ICD-10-CM

## 2021-03-23 DIAGNOSIS — F33.41 RECURRENT MAJOR DEPRESSIVE DISORDER, IN PARTIAL REMISSION (HCC): ICD-10-CM

## 2021-03-23 DIAGNOSIS — F10.21 ALCOHOLISM IN REMISSION (HCC): ICD-10-CM

## 2021-03-23 DIAGNOSIS — R73.9 HYPERGLYCEMIA: ICD-10-CM

## 2021-03-23 DIAGNOSIS — I10 ESSENTIAL HYPERTENSION: Primary | ICD-10-CM

## 2021-03-23 DIAGNOSIS — I67.9 CEREBROVASCULAR DISEASE: ICD-10-CM

## 2021-03-23 DIAGNOSIS — R41.3 MEMORY CHANGES: ICD-10-CM

## 2021-03-23 DIAGNOSIS — E66.9 OBESITY (BMI 30-39.9): ICD-10-CM

## 2021-03-23 DIAGNOSIS — U07.1 COVID-19 VIRUS INFECTION: ICD-10-CM

## 2021-03-23 DIAGNOSIS — F16.21: ICD-10-CM

## 2021-03-23 DIAGNOSIS — K59.00 CONSTIPATION, UNSPECIFIED CONSTIPATION TYPE: ICD-10-CM

## 2021-03-23 DIAGNOSIS — R94.01 ABNORMAL EEG: ICD-10-CM

## 2021-03-23 DIAGNOSIS — R44.1 VISUAL HALLUCINATIONS: ICD-10-CM

## 2021-03-23 PROBLEM — F10.20 ALCOHOL DEPENDENCE (HCC): Status: RESOLVED | Noted: 2018-04-02 | Resolved: 2021-03-23

## 2021-03-23 PROCEDURE — 99215 OFFICE O/P EST HI 40 MIN: CPT | Performed by: FAMILY MEDICINE

## 2021-03-23 RX ORDER — SERTRALINE HYDROCHLORIDE 100 MG/1
100 TABLET, FILM COATED ORAL DAILY
Qty: 30 TABLET | Refills: 5 | Status: SHIPPED | OUTPATIENT
Start: 2021-03-23 | End: 2021-08-16 | Stop reason: SDUPTHER

## 2021-03-23 RX ORDER — ROSUVASTATIN CALCIUM 5 MG/1
5 TABLET, COATED ORAL DAILY
Qty: 30 TABLET | Refills: 5 | Status: SHIPPED | OUTPATIENT
Start: 2021-03-23 | End: 2021-07-28

## 2021-03-23 RX ORDER — ASPIRIN 81 MG/1
81 TABLET ORAL DAILY
COMMUNITY

## 2021-03-23 NOTE — ASSESSMENT & PLAN NOTE
At [facility] 02/28/2021 with suicidal ideations  Zoloft was started  At the present time patient has no suicidal ideations  MDD seems improved    Will increase Zoloft from  patient to follow with psychiatry

## 2021-03-23 NOTE — TELEPHONE ENCOUNTER
Spoke w Marguerite Maldonado at [facility] office    Best contact number for patient:      Marguerite Maldonado said all demo info is correct    Emergency Contact name and number:    [facility]    Primary Care Provider Name and if affiliated with [facility]:          Sage Velez is [facility] PCP    Reason for Appointment/Dx:         Abnormal EEG  R41 3 (ICD-10-CM) - Memory changes  R44 1 (ICD-10-CM) - Visual hallucinations  I67 9 (ICD-10-CM) - Cerebrovascular disease                Have you seen and followed up with a pediatric Neurologist for this disease in the past?    N/a   (If yes ok to schedule with Dr Vikki Huff)    Neurology Location patient would like to be seen:      Per Marguerite Maldonado - pt willing to drive to [facility]    Order received? Yes                                                Records Received? Yes    Have you ever seen another Neurologist?        Don't know, didn't speak to pt    Insurance Information    Insurance Name:      Medicare A&B  ID/Policy #:    Secondary Insurance:    ID/Policy#: Workman's Comp/ Accident/ School  Information      Workman's Comp/Accident/School related?              Not sure, didn't speak to pt    If yes name of Insurance company:    Date of Injury:    Type of Injury:    [address] Name and Telephone Number:    Notes:                   Appointment date:   Thursday 6/3/21  3p  Darlyn Phelps    Sent NP - Memory  - packet    Put on [facility] - per PCP

## 2021-03-23 NOTE — PROGRESS NOTES
Assessment and Plan:  1  Hypertension, stable continue present therapy  2  Alcoholism in remission last drink approximately 5 weeks ago in February  3  COVID infection, resolved O2 sat 99% patient is symptom free  This was 03/03/2021 at [facility]  4  Abnormal EEG,  Negative for seizures question encephalopathy  Refer to Neurology  5  Cerebrovascular disease, small-vessel  I will start aspirin 81 mg and statin 5 mg of Crestor daily  6  Hyperlipidemia, Crestor as above  7  MDD, patient was hospitalized with suicidal ideation 02/28/2021 at [facility]   Started on Zoloft  Negative SI at the present time  Symptoms improved but not fully resolved  Will increase Zoloft from 50 up to 100 mg daily patient follow-up with Psychiatry as planned   8  Visual hallucinations secondary to LSD use in the 60s  Patient follow with of of neurology and psychiatry  9  BMI 30 48 diet exercise and weight loss recommended   10  Memory changes  MRI discussed as above  Refer to neurology new patient has refused geriatric consultation   11  Constipation status post ER evaluation with manual disimpaction, symptom resolved  12  Patient to return May 2nd as planned complete blood work as ordered  In light of multiple medical conditions in patient's chronic refuses for specialist   I am referring to nurse manager for help with getting him to his specialists    Problem List Items Addressed This Visit        Cardiovascular and Mediastinum    Essential hypertension - Primary      Stable continue present therapy         Cerebrovascular disease      Neurology consultation is ordered   Aspirin 81 mg daily to be used   Start Crestor 5 mg daily         Relevant Orders    Ambulatory referral to Neurology       Other    Recurrent major depressive disorder, in partial remission [facility])      At [facility] 02/28/2021 with suicidal ideations  Zoloft was started  At the present time patient has no suicidal ideations  MDD seems improved  Will increase Zoloft from  patient to follow with psychiatry         Relevant Medications    sertraline (ZOLOFT) 100 mg tablet    Obesity (BMI 30-39  9)      BMI 30 48 low-fat diet recommended         Mixed hyperlipidemia      In light of cerebrovascular disease start Crestor 5 mg daily         Relevant Medications    rosuvastatin (CRESTOR) 5 mg tablet    Hyperglycemia      Blood work ordered         Visual hallucinations      Follow-up with Psychiatry         Relevant Orders    Ambulatory referral to Neurology    Lysergic acid diethylamide (LSD) use disorder, moderate, in sustained remission ([facility] )      To follow psychiatry         Relevant Medications    sertraline (ZOLOFT) 100 mg tablet    Memory changes    Relevant Orders    Ambulatory referral to Neurology    COVID-19 virus infection       O2 sat 99% status post COVID 03/03/2021 at [address] in remission [facility])      Patient stop drinking 2/21         Relevant Medications    sertraline (ZOLOFT) 100 mg tablet    Abnormal EEG      Patient must follow-up with Neurology         Relevant Orders    Ambulatory referral to Neurology      Other Visit Diagnoses     Constipation, unspecified constipation type                     Diagnoses and all orders for this visit:    Essential hypertension    Alcoholism in remission ([facility] )    COVID-19 virus infection    Abnormal EEG  -     Ambulatory referral to Neurology; Future    Hyperglycemia    Lysergic acid diethylamide (LSD) use disorder, moderate, in sustained remission ([facility] )    Memory changes  -     Ambulatory referral to Neurology; Future    Mixed hyperlipidemia  -     rosuvastatin (CRESTOR) 5 mg tablet; Take 1 tablet (5 mg total) by mouth daily    Obesity (BMI 30-39  9)    Visual hallucinations  -     Ambulatory referral to Neurology; Future    Recurrent major depressive disorder, in partial remission (HCC)  -     sertraline (ZOLOFT) 100 mg tablet;  Take 1 tablet (100 mg total) by mouth daily    Cerebrovascular disease  -     Ambulatory referral to Neurology; Future    Constipation, unspecified constipation type    Other orders  -     aspirin (ECOTRIN LOW STRENGTH) 81 mg EC tablet; Take 81 mg by mouth daily              Subjective:      Patient ID: Peyman Denise is a 78 y o  male  CC:    Chief Complaint   Patient presents with    Follow-up     From hospital and 4 months  mjs    Results     MRI        HPI:     Patient was in [facility] 2/28 and discharged 03/06/2021  Initially he went in for depression/ suicidal ideation ended up being COVID positive  [facility]  Doing well  Patient denies any suicidal ideation at the present time  Patient has stopped drinking  Depression seems improved not fully resolved  Patient does have upcoming appoint with Psychiatry  EEG and MRI testing was discussed  Patient has refused geriatric evaluation neurologic evaluation  I explained to patient he does have an abnormal EEG which possibly shows encephalopathy he needs to follow-up with neurology  Patient is in agreement  Patient's was subsequently seen in the ER for constipation this has resolved  The following portions of the patient's history were reviewed and updated as appropriate: allergies, current medications, past family history, past medical history, past social history, past surgical history and problem list       Review of Systems   Constitutional: Negative  HENT: Negative  Eyes: Negative  Respiratory: Negative  Cardiovascular: Negative  Gastrointestinal:        HPI   Endocrine: Negative  Genitourinary: Negative  Musculoskeletal: Negative  Skin: Negative  Allergic/Immunologic: Negative  Neurological: Negative  Hematological: Negative      Psychiatric/Behavioral:        HPI         Data to review:       Objective:    Vitals:    03/23/21 0904 03/23/21 0919   BP: 114/70    Pulse: 96    Temp: 98 3 °F (36 8 °C)    SpO2:  99%   Weight: 84 4 kg (186 lb)    Height: 5' 5 5" (1 664 m)         Physical Exam  Vitals signs and nursing note reviewed  Constitutional:       Appearance: Normal appearance  HENT:      Head: Normocephalic and atraumatic  Eyes:      General: No scleral icterus  Neck:      Musculoskeletal: Neck supple  Vascular: No carotid bruit  Cardiovascular:      Rate and Rhythm: Normal rate and regular rhythm  Heart sounds: Normal heart sounds  Pulmonary:      Effort: Pulmonary effort is normal       Breath sounds: Normal breath sounds  Abdominal:      General: Bowel sounds are normal       Palpations: Abdomen is soft  Tenderness: There is no abdominal tenderness  Musculoskeletal:      Right lower leg: No edema  Left lower leg: No edema  Skin:     General: Skin is warm and dry  Neurological:      General: No focal deficit present  Psychiatric:         Mood and Affect: Mood normal            BMI Counseling: Body mass index is 30 48 kg/m²  The BMI is above normal  Nutrition recommendations include moderation in carbohydrate intake and reducing intake of cholesterol  Exercise recommendations include exercising 3-5 times per week

## 2021-03-23 NOTE — PATIENT INSTRUCTIONS
Follow psychiatry as planned   Patient must follow-up with neurology for further evaluation memory loss, abnormal EEG  Low-fat low-cholesterol diet recommended  Patient start Crestor 5 mg daily to lower cholesterol   Start aspirin 81 mg daily   Return in May for office visit blood work sooner if needed

## 2021-03-30 ENCOUNTER — TELEPHONE (OUTPATIENT)
Dept: NEUROLOGY | Facility: CLINIC | Age: 71
End: 2021-03-30

## 2021-03-30 ENCOUNTER — PATIENT OUTREACH (OUTPATIENT)
Dept: FAMILY MEDICINE CLINIC | Facility: CLINIC | Age: 71
End: 2021-03-30

## 2021-03-30 NOTE — PROGRESS NOTES
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

## 2021-04-01 ENCOUNTER — CONSULT (OUTPATIENT)
Dept: NEUROLOGY | Facility: CLINIC | Age: 71
End: 2021-04-01
Payer: MEDICARE

## 2021-04-01 VITALS
HEART RATE: 87 BPM | BODY MASS INDEX: 28.19 KG/M2 | SYSTOLIC BLOOD PRESSURE: 110 MMHG | HEIGHT: 68 IN | DIASTOLIC BLOOD PRESSURE: 70 MMHG | WEIGHT: 186 LBS

## 2021-04-01 DIAGNOSIS — G25.9 EXTRAPYRAMIDAL AND MOVEMENT DISORDER, UNSPECIFIED: ICD-10-CM

## 2021-04-01 DIAGNOSIS — I67.9 CEREBROVASCULAR DISEASE: ICD-10-CM

## 2021-04-01 DIAGNOSIS — R44.1 VISUAL HALLUCINATIONS: ICD-10-CM

## 2021-04-01 DIAGNOSIS — R94.01 ABNORMAL EEG: ICD-10-CM

## 2021-04-01 DIAGNOSIS — R41.3 MEMORY CHANGES: ICD-10-CM

## 2021-04-01 PROBLEM — G20 PARKINSONIAN SYNDROME (HCC): Status: ACTIVE | Noted: 2021-04-01

## 2021-04-01 PROCEDURE — 99204 OFFICE O/P NEW MOD 45 MIN: CPT | Performed by: PSYCHIATRY & NEUROLOGY

## 2021-04-01 NOTE — ASSESSMENT & PLAN NOTE
Pt has resting tremors in left hand, slowness, changes in facial expression, mask facies, change in voice, change in hand writing, clumsiness, depressed mood, constipation, memory trouble, visual hallucination  His tremors in left hand seems to be classic of parkinsonism which is mostly resting, increases when walking, he also has mild rigidity on distraction left arm> Rt arm, decrement in finger tap on left> Rt, stooped posture  Reviewed MRI results as unable to open pictures- has enlarged ventricles, EEG shows decrease PDR-8 Hz which is seen in mild encephalopathy  Unclear if memory trouble started first vs tremor/motor symptoms  If motor symptoms started first likely has Primary Parkinsons however if memory trouble started first then its likely Lewy body dementia since, pt is poor historian so unsure what started first    Regardless, treatment will be similar and a trial of Sinemet will be helpful  Primary Parkinsons should respond to the medication better  We discussed about the medication and pt doesn't want to try at this time  He mostly say things are d/t old age  Pt still driving and its concerning as he got 19/30 in [facility]- mostly had trouble with executive function, cube draw, recall 1/5, calculation trouble, decreased fluency  He says he is currently independent in all ADLs including finances, driving and lives alone  Plan-   · Discussed the plan with patient (seems to have poor insight)  · Discussed with pt's daughter- [phone] about the plan and she is agreeable   · Pt doesn't want to try any new med at this time     · Follow up in 3 months

## 2021-04-23 ENCOUNTER — TRANSCRIBE ORDERS (OUTPATIENT)
Dept: LAB | Facility: CLINIC | Age: 71
End: 2021-04-23

## 2021-04-23 ENCOUNTER — APPOINTMENT (OUTPATIENT)
Dept: LAB | Facility: CLINIC | Age: 71
End: 2021-04-23
Payer: MEDICARE

## 2021-04-23 DIAGNOSIS — R41.3 MEMORY LOSS: Primary | ICD-10-CM

## 2021-04-23 DIAGNOSIS — R41.3 MEMORY LOSS: ICD-10-CM

## 2021-04-23 DIAGNOSIS — R41.3 MEMORY CHANGES: ICD-10-CM

## 2021-04-23 DIAGNOSIS — G25.9 EXTRAPYRAMIDAL AND MOVEMENT DISORDER, UNSPECIFIED: ICD-10-CM

## 2021-04-23 LAB
FOLATE SERPL-MCNC: >20 NG/ML (ref 3.1–17.5)
VIT B12 SERPL-MCNC: 817 PG/ML (ref 100–900)

## 2021-04-23 PROCEDURE — 84207 ASSAY OF VITAMIN B-6: CPT

## 2021-04-23 PROCEDURE — 36415 COLL VENOUS BLD VENIPUNCTURE: CPT

## 2021-04-23 PROCEDURE — 82607 VITAMIN B-12: CPT

## 2021-04-23 PROCEDURE — 82746 ASSAY OF FOLIC ACID SERUM: CPT

## 2021-04-23 PROCEDURE — 84425 ASSAY OF VITAMIN B-1: CPT

## 2021-04-30 LAB — VIT B6 SERPL-MCNC: 28.9 UG/L (ref 5.3–46.7)

## 2021-05-01 LAB — VIT B1 BLD-SCNC: 189.8 NMOL/L (ref 66.5–200)

## 2021-05-03 ENCOUNTER — OFFICE VISIT (OUTPATIENT)
Dept: FAMILY MEDICINE CLINIC | Facility: CLINIC | Age: 71
End: 2021-05-03
Payer: MEDICARE

## 2021-05-03 ENCOUNTER — APPOINTMENT (OUTPATIENT)
Dept: LAB | Facility: CLINIC | Age: 71
End: 2021-05-03
Payer: MEDICARE

## 2021-05-03 VITALS
WEIGHT: 196 LBS | SYSTOLIC BLOOD PRESSURE: 124 MMHG | DIASTOLIC BLOOD PRESSURE: 78 MMHG | BODY MASS INDEX: 29.7 KG/M2 | HEIGHT: 68 IN | HEART RATE: 88 BPM

## 2021-05-03 DIAGNOSIS — E78.2 MIXED HYPERLIPIDEMIA: ICD-10-CM

## 2021-05-03 DIAGNOSIS — R73.9 HYPERGLYCEMIA: ICD-10-CM

## 2021-05-03 DIAGNOSIS — R94.01 ABNORMAL EEG: ICD-10-CM

## 2021-05-03 DIAGNOSIS — R41.3 MEMORY CHANGES: ICD-10-CM

## 2021-05-03 DIAGNOSIS — Z12.5 SCREENING FOR PROSTATE CANCER: ICD-10-CM

## 2021-05-03 DIAGNOSIS — Z12.11 SCREENING FOR COLON CANCER: ICD-10-CM

## 2021-05-03 DIAGNOSIS — F33.41 RECURRENT MAJOR DEPRESSIVE DISORDER, IN PARTIAL REMISSION (HCC): ICD-10-CM

## 2021-05-03 DIAGNOSIS — F16.21: ICD-10-CM

## 2021-05-03 DIAGNOSIS — I67.9 CEREBROVASCULAR DISEASE: Primary | ICD-10-CM

## 2021-05-03 DIAGNOSIS — R44.1 VISUAL HALLUCINATIONS: ICD-10-CM

## 2021-05-03 DIAGNOSIS — I10 ESSENTIAL HYPERTENSION: ICD-10-CM

## 2021-05-03 DIAGNOSIS — F10.21 ALCOHOLISM IN REMISSION (HCC): ICD-10-CM

## 2021-05-03 DIAGNOSIS — G20 PARKINSON'S DISEASE (HCC): ICD-10-CM

## 2021-05-03 PROBLEM — U07.1 COVID-19 VIRUS INFECTION: Status: RESOLVED | Noted: 2021-03-03 | Resolved: 2021-05-03

## 2021-05-03 PROBLEM — R25.1 TREMOR: Status: RESOLVED | Noted: 2020-12-24 | Resolved: 2021-05-03

## 2021-05-03 LAB
ALBUMIN SERPL BCP-MCNC: 3.9 G/DL (ref 3.5–5)
ALP SERPL-CCNC: 55 U/L (ref 46–116)
ALT SERPL W P-5'-P-CCNC: 31 U/L (ref 12–78)
ANION GAP SERPL CALCULATED.3IONS-SCNC: 6 MMOL/L (ref 4–13)
AST SERPL W P-5'-P-CCNC: 13 U/L (ref 5–45)
BILIRUB SERPL-MCNC: 0.49 MG/DL (ref 0.2–1)
BUN SERPL-MCNC: 17 MG/DL (ref 5–25)
CALCIUM SERPL-MCNC: 9.4 MG/DL (ref 8.3–10.1)
CHLORIDE SERPL-SCNC: 106 MMOL/L (ref 100–108)
CHOLEST SERPL-MCNC: 154 MG/DL (ref 50–200)
CO2 SERPL-SCNC: 27 MMOL/L (ref 21–32)
CREAT SERPL-MCNC: 0.78 MG/DL (ref 0.6–1.3)
GFR SERPL CREATININE-BSD FRML MDRD: 93 ML/MIN/1.73SQ M
GLUCOSE P FAST SERPL-MCNC: 97 MG/DL (ref 65–99)
HDLC SERPL-MCNC: 68 MG/DL
LDLC SERPL CALC-MCNC: 66 MG/DL (ref 0–100)
POTASSIUM SERPL-SCNC: 3.9 MMOL/L (ref 3.5–5.3)
PROT SERPL-MCNC: 7.2 G/DL (ref 6.4–8.2)
SODIUM SERPL-SCNC: 139 MMOL/L (ref 136–145)
TRIGL SERPL-MCNC: 99 MG/DL

## 2021-05-03 PROCEDURE — 99214 OFFICE O/P EST MOD 30 MIN: CPT | Performed by: FAMILY MEDICINE

## 2021-05-03 PROCEDURE — 80061 LIPID PANEL: CPT

## 2021-05-03 PROCEDURE — 80053 COMPREHEN METABOLIC PANEL: CPT

## 2021-05-03 PROCEDURE — 36415 COLL VENOUS BLD VENIPUNCTURE: CPT

## 2021-05-03 NOTE — PROGRESS NOTES
Assessment and Plan:   1  Cerebrovascular disease, continue aspirin statin therapy  2  Hypertension, stable continue present therapy  3  Parkinson's disease/abnormal EEG/ memory changes/visual hallucinations, all stable continue present therapy follows with Neurology  4  Alcoholism LSD use, both in remission  5  Hyperglycemia diet controlled  6  Hyperlipidemia, on Crestor complete blood work today  7  Screening colon cancer will reorder Cologuard  8  Screening prostate cancer, PSA is ordered   9  Patient to return in 6 months for office visit, blood work, and a [address]  10  MDD, patient denies any symptomatology  Patient does have upcoming point with Psychiatry 05/11/2021    Problem List Items Addressed This Visit        Cardiovascular and Mediastinum    Essential hypertension      Stable continue present therapy         Relevant Orders    CBC    Comprehensive metabolic panel    Hemoglobin A1C    Lipid Panel with Direct LDL reflex    TSH, 3rd generation with Free T4 reflex    UA (URINE) with reflex to Scope    PSA, Total Screen    Comprehensive metabolic panel    Lipid Panel with Direct LDL reflex    Cerebrovascular disease - Primary      Continue aspirin/ statin therapy         Relevant Orders    CBC    Comprehensive metabolic panel    Hemoglobin A1C    Lipid Panel with Direct LDL reflex    TSH, 3rd generation with Free T4 reflex    UA (URINE) with reflex to Scope    PSA, Total Screen       Nervous and Auditory    Parkinson's disease [facility])      Patient follow with neurology         Relevant Orders    CBC    Comprehensive metabolic panel    Hemoglobin A1C    Lipid Panel with Direct LDL reflex    TSH, 3rd generation with Free T4 reflex    UA (URINE) with reflex to Scope    PSA, Total Screen       Other    Recurrent major depressive disorder, in partial remission (HCC)      Stable continue present therapy         Relevant Orders    CBC    Comprehensive metabolic panel    Hemoglobin A1C    Lipid Panel with Direct LDL reflex TSH, 3rd generation with Free T4 reflex    UA (URINE) with reflex to Scope    PSA, Total Screen    Mixed hyperlipidemia      Blood work ordered         Relevant Orders    CBC    Comprehensive metabolic panel    Hemoglobin A1C    Lipid Panel with Direct LDL reflex    TSH, 3rd generation with Free T4 reflex    UA (URINE) with reflex to Scope    PSA, Total Screen    Comprehensive metabolic panel    Lipid Panel with Direct LDL reflex    Screening for prostate cancer      PSA ordered for next blood work         Relevant Orders    CBC    Comprehensive metabolic panel    Hemoglobin A1C    Lipid Panel with Direct LDL reflex    TSH, 3rd generation with Free T4 reflex    UA (URINE) with reflex to Scope    PSA, Total Screen    Screening for colon cancer      Cologuard reordered         Relevant Orders    CBC    Comprehensive metabolic panel    Hemoglobin A1C    Lipid Panel with Direct LDL reflex    TSH, 3rd generation with Free T4 reflex    UA (URINE) with reflex to Scope    PSA, Total Screen    Cologuard    Hyperglycemia      Blood work ordered         Relevant Orders    CBC    Comprehensive metabolic panel    Hemoglobin A1C    Lipid Panel with Direct LDL reflex    TSH, 3rd generation with Free T4 reflex    UA (URINE) with reflex to Scope    PSA, Total Screen    Comprehensive metabolic panel    Lipid Panel with Direct LDL reflex    Visual hallucinations      Follow-up with neurology  Probably secondary to Parkinson's  Patient does have upcoming point with Psychiatry 05/11/2021         Relevant Orders    CBC    Comprehensive metabolic panel    Hemoglobin A1C    Lipid Panel with Direct LDL reflex    TSH, 3rd generation with Free T4 reflex    UA (URINE) with reflex to Scope    PSA, Total Screen    Lysergic acid diethylamide (LSD) use disorder, moderate, in sustained remission [facility])      Patient never went to Psychiatry however patient does have Parkinson's which can cause visual hallucinations  Addendum    Patient has appointment with psychiatry 05/11/2021         Relevant Orders    CBC    Comprehensive metabolic panel    Hemoglobin A1C    Lipid Panel with Direct LDL reflex    TSH, 3rd generation with Free T4 reflex    UA (URINE) with reflex to Scope    PSA, Total Screen    Memory changes      Stable follow-up neurology         Relevant Orders    CBC    Comprehensive metabolic panel    Hemoglobin A1C    Lipid Panel with Direct LDL reflex    TSH, 3rd generation with Free T4 reflex    UA (URINE) with reflex to Scope    PSA, Total Screen    Alcoholism in remission [facility])      Patient states he still is not drinking         Relevant Orders    CBC    Comprehensive metabolic panel    Hemoglobin A1C    Lipid Panel with Direct LDL reflex    TSH, 3rd generation with Free T4 reflex    UA (URINE) with reflex to Scope    PSA, Total Screen    Abnormal EEG      Stable follow-up with Neurology         Relevant Orders    CBC    Comprehensive metabolic panel    Hemoglobin A1C    Lipid Panel with Direct LDL reflex    TSH, 3rd generation with Free T4 reflex    UA (URINE) with reflex to Scope    PSA, Total Screen                 Diagnoses and all orders for this visit:    Cerebrovascular disease  -     CBC; Future  -     Comprehensive metabolic panel; Future  -     Hemoglobin A1C; Future  -     Lipid Panel with Direct LDL reflex; Future  -     TSH, 3rd generation with Free T4 reflex; Future  -     UA (URINE) with reflex to Scope; Future  -     PSA, Total Screen; Future    Essential hypertension  -     CBC; Future  -     Comprehensive metabolic panel; Future  -     Hemoglobin A1C; Future  -     Lipid Panel with Direct LDL reflex; Future  -     TSH, 3rd generation with Free T4 reflex; Future  -     UA (URINE) with reflex to Scope; Future  -     PSA, Total Screen; Future  -     Comprehensive metabolic panel; Future  -     Lipid Panel with Direct LDL reflex; Future    Parkinson's disease ([facility] [address] )  -     CBC;  Future  -     Comprehensive metabolic panel; Future  -     Hemoglobin A1C; Future  -     Lipid Panel with Direct LDL reflex; Future  -     TSH, 3rd generation with Free T4 reflex; Future  -     UA (URINE) with reflex to Scope; Future  -     PSA, Total Screen; Future    Abnormal EEG  -     CBC; Future  -     Comprehensive metabolic panel; Future  -     Hemoglobin A1C; Future  -     Lipid Panel with Direct LDL reflex; Future  -     TSH, 3rd generation with Free T4 reflex; Future  -     UA (URINE) with reflex to Scope; Future  -     PSA, Total Screen; Future    Alcoholism in remission ([facility] [address] )  -     CBC; Future  -     Comprehensive metabolic panel; Future  -     Hemoglobin A1C; Future  -     Lipid Panel with Direct LDL reflex; Future  -     TSH, 3rd generation with Free T4 reflex; Future  -     UA (URINE) with reflex to Scope; Future  -     PSA, Total Screen; Future    Hyperglycemia  -     CBC; Future  -     Comprehensive metabolic panel; Future  -     Hemoglobin A1C; Future  -     Lipid Panel with Direct LDL reflex; Future  -     TSH, 3rd generation with Free T4 reflex; Future  -     UA (URINE) with reflex to Scope; Future  -     PSA, Total Screen; Future  -     Comprehensive metabolic panel; Future  -     Lipid Panel with Direct LDL reflex; Future    Lysergic acid diethylamide (LSD) use disorder, moderate, in sustained remission (HCC)  -     CBC; Future  -     Comprehensive metabolic panel; Future  -     Hemoglobin A1C; Future  -     Lipid Panel with Direct LDL reflex; Future  -     TSH, 3rd generation with Free T4 reflex; Future  -     UA (URINE) with reflex to Scope; Future  -     PSA, Total Screen; Future    Memory changes  -     CBC; Future  -     Comprehensive metabolic panel; Future  -     Hemoglobin A1C; Future  -     Lipid Panel with Direct LDL reflex; Future  -     TSH, 3rd generation with Free T4 reflex; Future  -     UA (URINE) with reflex to Scope; Future  -     PSA, Total Screen; Future    Mixed hyperlipidemia  -     CBC;  Future  -     Comprehensive metabolic panel; Future  -     Hemoglobin A1C; Future  -     Lipid Panel with Direct LDL reflex; Future  -     TSH, 3rd generation with Free T4 reflex; Future  -     UA (URINE) with reflex to Scope; Future  -     PSA, Total Screen; Future  -     Comprehensive metabolic panel; Future  -     Lipid Panel with Direct LDL reflex; Future    Recurrent major depressive disorder, in partial remission (HCC)  -     CBC; Future  -     Comprehensive metabolic panel; Future  -     Hemoglobin A1C; Future  -     Lipid Panel with Direct LDL reflex; Future  -     TSH, 3rd generation with Free T4 reflex; Future  -     UA (URINE) with reflex to Scope; Future  -     PSA, Total Screen; Future    Screening for colon cancer  -     CBC; Future  -     Comprehensive metabolic panel; Future  -     Hemoglobin A1C; Future  -     Lipid Panel with Direct LDL reflex; Future  -     TSH, 3rd generation with Free T4 reflex; Future  -     UA (URINE) with reflex to Scope; Future  -     PSA, Total Screen; Future  -     Cologuard; Future    Visual hallucinations  -     CBC; Future  -     Comprehensive metabolic panel; Future  -     Hemoglobin A1C; Future  -     Lipid Panel with Direct LDL reflex; Future  -     TSH, 3rd generation with Free T4 reflex; Future  -     UA (URINE) with reflex to Scope; Future  -     PSA, Total Screen; Future    Screening for prostate cancer  -     CBC; Future  -     Comprehensive metabolic panel; Future  -     Hemoglobin A1C; Future  -     Lipid Panel with Direct LDL reflex; Future  -     TSH, 3rd generation with Free T4 reflex; Future  -     UA (URINE) with reflex to Scope; Future  -     PSA, Total Screen; Future              Subjective:      Patient ID: Mati Owens is a 78 y o  male  CC:    Chief Complaint   Patient presents with    Follow-up    Results     s       HPI:     Patient is here for routine follow-up  Unfortunately patient did not complete blood work    Patient denies ever getting Cologuard sent to his house we will do that form  Neurology consultation red appreciated and he is to follow-up with neurologist has per instructed      The following portions of the patient's history were reviewed and updated as appropriate: allergies, current medications, past family history, past medical history, past social history, past surgical history and problem list       Review of Systems   Constitutional: Negative  HENT: Negative  Eyes: Negative  Respiratory: Negative  Cardiovascular: Negative  Gastrointestinal: Negative  Endocrine: Negative  Genitourinary: Negative  Musculoskeletal: Negative  Skin: Negative  Allergic/Immunologic: Negative  Neurological:        HPI   Hematological: Negative  Psychiatric/Behavioral: Negative  Data to review:       Objective:    Vitals:    05/03/21 0800   BP: 124/78   Pulse: 88   Weight: 88 9 kg (196 lb)   Height: 5' 8" (1 727 m)        Physical Exam  Vitals signs reviewed  Constitutional:       Appearance: Normal appearance  HENT:      Head: Normocephalic and atraumatic  Eyes:      General: No scleral icterus  Neck:      Musculoskeletal: Neck supple  Vascular: No carotid bruit  Cardiovascular:      Rate and Rhythm: Normal rate and regular rhythm  Heart sounds: Normal heart sounds  Pulmonary:      Effort: Pulmonary effort is normal       Breath sounds: Normal breath sounds  Abdominal:      General: Bowel sounds are normal       Palpations: Abdomen is soft  Tenderness: There is no abdominal tenderness  Musculoskeletal:      Right lower leg: No edema  Left lower leg: No edema  Skin:     General: Skin is warm and dry  Neurological:      Mental Status: He is alert  Mental status is at baseline  Psychiatric:         Mood and Affect: Mood normal               Falls Plan of Care: Balance, strength, and gait training instructions were provided

## 2021-05-03 NOTE — ASSESSMENT & PLAN NOTE
Patient never went to Psychiatry however patient does have Parkinson's which can cause visual hallucinations  Addendum    Patient has appointment with psychiatry 05/11/2021

## 2021-05-03 NOTE — ASSESSMENT & PLAN NOTE
Follow-up with neurology  Probably secondary to Parkinson's    Patient does have upcoming point with Psychiatry 05/11/2021

## 2021-05-03 NOTE — PATIENT INSTRUCTIONS
Fall Prevention   AMBULATORY CARE:   Fall prevention  includes ways to make your home and other areas safer  It also includes ways you can move more carefully to prevent a fall  Health conditions that cause changes in your blood pressure, vision, or muscle strength and coordination may increase your risk for falls  Medicines may also increase your risk for falls if they make you dizzy, weak, or sleepy  Call 911 or have someone else call if:   · You have fallen and are unconscious  · You have fallen and cannot move part of your body  Contact your healthcare provider if:   · You have fallen and have pain or a headache  · You have questions or concerns about your condition or care  Fall prevention tips:   · Stand or sit up slowly  This may help you keep your balance and prevent falls  · Use assistive devices as directed  Your healthcare provider may suggest that you use a cane or walker to help you keep your balance  You may need to have grab bars put in your bathroom near the toilet or in the shower  · Wear shoes that fit well and have soles that   Wear shoes both inside and outside  Use slippers with good   Do not wear shoes with high heels  · Wear a personal alarm  This is a device that allows you to call 911 if you fall and need help  Ask your healthcare provider for more information  · Stay active  Exercise can help strengthen your muscles and improve your balance  Your healthcare provider may recommend water aerobics or walking  He or she may also recommend physical therapy to improve your coordination  Never start an exercise program without talking to your healthcare provider first          · Manage your medical conditions  Keep all appointments with your healthcare providers  Visit your eye doctor as directed  Home safety tips:       · Add items to prevent falls in the bathroom  Put nonslip strips on your bath or shower floor to prevent you from slipping   Use a bath mat if you do not have carpet in the bathroom  This will prevent you from falling when you step out of the bath or shower  Use a shower seat so you do not need to stand while you shower  Sit on the toilet or a chair in your bathroom to dry yourself and put on clothing  This will prevent you from losing your balance from drying or dressing yourself while you are standing  · Keep paths clear  Remove books, shoes, and other objects from walkways and stairs  Place cords for telephones and lamps out of the way so that you do not need to walk over them  Tape them down if you cannot move them  Remove small rugs  If you cannot remove a rug, secure it with double-sided tape  This will prevent you from tripping  · Install bright lights in your home  Use night lights to help light paths to the bathroom or kitchen  Always turn on the light before you start walking  · Keep items you use often on shelves within reach  Do not use a step stool to help you reach an item  · Paint or place reflective tape on the edges of your stairs  This will help you see the stairs better  Follow up with your healthcare provider as directed:  Write down your questions so you remember to ask them during your visits  © Copyright [address] Information is for End User's use only and may not be sold, redistributed or otherwise used for commercial purposes  All illustrations and images included in CareNotes® are the copyrighted property of [employer]  or [facility]  The above information is an  only  It is not intended as medical advice for individual conditions or treatments  Talk to your doctor, nurse or pharmacist before following any medical regimen to see if it is safe and effective for you       complete blood work today  Follow-up with specialist per their instructions   Complete Cologuard as ordered   Return in 6 months for office visit, blood work, and a [place]

## 2021-05-04 ENCOUNTER — TELEPHONE (OUTPATIENT)
Dept: FAMILY MEDICINE CLINIC | Facility: CLINIC | Age: 71
End: 2021-05-04

## 2021-05-04 NOTE — TELEPHONE ENCOUNTER
PT CAME IN THE OFFICE WITH THE COLOGUARD KIT STATING "IT IS TO MUCH FOR ME, IT LOOKS TO COMPLICATED " HE WANTED ME TO LET DR BRUNNER KNOW NOTHING IS WRONG WITH HIM AND HE WILL NOT BE DOING THE SAMPLE

## 2021-05-11 ENCOUNTER — OFFICE VISIT (OUTPATIENT)
Dept: PSYCHIATRY | Facility: CLINIC | Age: 71
End: 2021-05-11
Payer: MEDICARE

## 2021-05-11 DIAGNOSIS — F33.41 RECURRENT MAJOR DEPRESSIVE DISORDER, IN PARTIAL REMISSION (HCC): Primary | ICD-10-CM

## 2021-05-11 DIAGNOSIS — R44.1 VISUAL HALLUCINATIONS: ICD-10-CM

## 2021-05-11 PROCEDURE — 90792 PSYCH DIAG EVAL W/MED SRVCS: CPT | Performed by: PSYCHIATRY & NEUROLOGY

## 2021-05-11 NOTE — BH TREATMENT PLAN
TREATMENT PLAN (Medication Management Only)        [facility]    Name and Date of Birth:  Trudy Adames 78 y o  1950  Date of Treatment Plan: May 11, 2021  Diagnosis/Diagnoses:    1  Recurrent major depressive disorder, in partial remission ([address] )    2  Visual hallucinations      Strengths/Personal Resources for Self-Care: supportive family, taking medications as prescribed  Area/Areas of need (in own words): depression, poor memory  1  Long Term Goal: Able to get a job     Though be given patient cognitive issues reviewed with the patient about other long-term goals which included mood stability, improvement in his memory  Target Date:6 weeks - 6/22/2021  Person/Persons responsible for completion of goal: Annie Steiner  2  Short Term Objective (s) - How will we reach this goal?:   A  Provider new recommended medication/dosage changes and/or continue medication(s): continue current medications as prescribed  B  N/A   C  N/A  Target Date:6 weeks - 6/22/2021  Person/Persons Responsible for Completion of Goal: Annie Steiner  Progress Towards Goals: continuing treatment  Treatment Modality: medication management every 6 weeks  Review due 180 days from date of this plan: 4 months - 9/11/2021  Expected length of service: ongoing treatment  My Physician/PA/NP and I have developed this plan together and I agree to work on the goals and objectives  I understand the treatment goals that were developed for my treatment
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
## 2021-06-05 DIAGNOSIS — I10 ESSENTIAL HYPERTENSION: ICD-10-CM

## 2021-06-06 RX ORDER — METOPROLOL SUCCINATE 50 MG/1
TABLET, EXTENDED RELEASE ORAL
Qty: 90 TABLET | Refills: 0 | Status: SHIPPED | OUTPATIENT
Start: 2021-06-06 | End: 2021-09-03

## 2021-06-11 ENCOUNTER — OFFICE VISIT (OUTPATIENT)
Dept: PSYCHIATRY | Facility: CLINIC | Age: 71
End: 2021-06-11
Payer: MEDICARE

## 2021-06-11 DIAGNOSIS — F33.41 RECURRENT MAJOR DEPRESSIVE DISORDER, IN PARTIAL REMISSION (HCC): ICD-10-CM

## 2021-06-11 PROCEDURE — 99213 OFFICE O/P EST LOW 20 MIN: CPT | Performed by: PSYCHIATRY & NEUROLOGY
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
## 2021-07-28 DIAGNOSIS — E78.2 MIXED HYPERLIPIDEMIA: ICD-10-CM

## 2021-07-28 RX ORDER — ROSUVASTATIN CALCIUM 5 MG/1
TABLET, COATED ORAL
Qty: 30 TABLET | Refills: 5 | Status: SHIPPED | OUTPATIENT
Start: 2021-07-28 | End: 2022-02-06 | Stop reason: SDUPTHER

## 2021-08-16 ENCOUNTER — OFFICE VISIT (OUTPATIENT)
Dept: PSYCHIATRY | Facility: CLINIC | Age: 71
End: 2021-08-16
Payer: MEDICARE

## 2021-08-16 DIAGNOSIS — F33.41 RECURRENT MAJOR DEPRESSIVE DISORDER, IN PARTIAL REMISSION (HCC): ICD-10-CM

## 2021-08-16 PROCEDURE — 99213 OFFICE O/P EST LOW 20 MIN: CPT | Performed by: PSYCHIATRY & NEUROLOGY

## 2021-08-16 RX ORDER — SERTRALINE HYDROCHLORIDE 100 MG/1
100 TABLET, FILM COATED ORAL DAILY
Qty: 30 TABLET | Refills: 5 | Status: SHIPPED | OUTPATIENT
Start: 2021-08-16 | End: 2021-08-24 | Stop reason: SDUPTHER
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
## 2021-08-23 ENCOUNTER — TELEPHONE (OUTPATIENT)
Dept: PSYCHIATRY | Facility: CLINIC | Age: 71
End: 2021-08-23
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
## 2021-08-24 DIAGNOSIS — F33.41 RECURRENT MAJOR DEPRESSIVE DISORDER, IN PARTIAL REMISSION (HCC): ICD-10-CM

## 2021-08-24 NOTE — PROGRESS NOTES
Give the patient concern about drowsiness, the patient was advised through support staff Alina to decrease the dose of Zoloft to 50 mg daily  He was also advised to see me sooner if his depression worsens and I can review other alternative option

## 2021-08-26 ENCOUNTER — OFFICE VISIT (OUTPATIENT)
Dept: NEUROLOGY | Facility: CLINIC | Age: 71
End: 2021-08-26
Payer: MEDICARE

## 2021-08-26 VITALS
WEIGHT: 185 LBS | HEIGHT: 68 IN | SYSTOLIC BLOOD PRESSURE: 120 MMHG | HEART RATE: 75 BPM | DIASTOLIC BLOOD PRESSURE: 70 MMHG | TEMPERATURE: 97.6 F | BODY MASS INDEX: 28.04 KG/M2

## 2021-08-26 DIAGNOSIS — G20 PARKINSON'S DISEASE (HCC): Primary | ICD-10-CM

## 2021-08-26 PROCEDURE — 99214 OFFICE O/P EST MOD 30 MIN: CPT | Performed by: PSYCHIATRY & NEUROLOGY

## 2021-08-26 NOTE — PATIENT INSTRUCTIONS
Start Sinemet 25/100 1 tab three times day (after breakfast, lunch and dinner)  Ambulatory referral to PT   He is not allowed to drive, will fill out PENDOT form  Things that we know are helpful for thinking and memory   1) Exercise program: gradually increase your physical activity over time  Start small and be patient  Aerobic (cardio) activity is best but incorporate balance, strength and flexibility training as well    a  Try to get at least 30min 3 times per week   2) Diet: Mediterranean diet (colorful fruits and vegetables, olive oil, fish, whole grains, very little red meet is any), MIND diet, anti-inflammatory diet  a  Stay well hydrated: drink 6-8 glasses of water per day   b  What's good for your heart is good for your brain  c  Avoid high-salt foods   3) Sleep: aim for at least 7-8 hours per night   a  Avoid alcohol and medications like Benadryl (Tylenol PM) or other sedating drugs  4) Stress management/mindfulness practice:   a  Talk with our social workers about finding a cognitive behavioral therapists  b  Try a smart phone marcelo like thephotocloser.com or Flybits for beginners   i  Try curable for pain    c  Take a course in Mindfulness Based Stress Reduction (MBSR)   i  Vanessa hilario  Read or listen to an audiobook about it:  i  Mindfulness for beginners   ii  10% happier  iii  The happiness advantage   5) Social engagement:   a  Stay in touch with family and friends   b  Plan a few specific activities for your social health every week   tyson Sawant a local support group   d  Volunteer! www [email or web address] or call [phone]    MIND diet score:  1 point for each component      - Green leafy vegetables: at least 6 per week  - Other vegetable: at least 1 per day   - Berries: at least 2 per week  - Red meat: fewer than 4 per week   - Fish: at least 1 per week   - Poultry: at least 2 per week  - Beans: at least 3 per week  - Nuts: at least 5 per week  - Fast or fried food: less than 1 per week  - Olive oil   - Butter less: less than 1 table-spoon per day   - Cheese: less than 1 serving per week   - Pastries/sweets: less than 5 servings per week  - Alcohol: no more than 1 serving/ day

## 2021-08-26 NOTE — ASSESSMENT & PLAN NOTE
78 y o  male w h/o HTN, HLD, chronic alcoholism, lumbar scoliosis, depression presents here as a follow up for parkinson's disease  Patient's symptoms has mostly been stable versus worsening in drooling, more stooped posture, shuffling gait, more falls since last visit  This time we discussed about the medication and patient is agreeable to start on Sinemet  Will see how patient does next visit if he continues to have increase in drooling we  may plan for Botox injection  will also think about starting him on rivastigmine for his cognitive impairment  Will hold for now      Plan-  · Start Sinemet 25/100 1 tab three times day (after breakfast, lunch and dinner)  · Ambulatory referral to PT   · He is not allowed to drive, will fill out PENDOT form  Things that we know are helpful for thinking and memory   1) Exercise program: gradually increase your physical activity over time  Start small and be patient  Aerobic (cardio) activity is best but incorporate balance, strength and flexibility training as well    a  Try to get at least 30min 3 times per week   2) Diet: Mediterranean diet (colorful fruits and vegetables, olive oil, fish, whole grains, very little red meet is any), MIND diet, anti-inflammatory diet  a  Stay well hydrated: drink 6-8 glasses of water per day   b  What's good for your heart is good for your brain  c  Avoid high-salt foods   3) Sleep: aim for at least 7-8 hours per night   a  Avoid alcohol and medications like Benadryl (Tylenol PM) or other sedating drugs  4) Stress management/mindfulness practice:   a  Talk with our social workers about finding a cognitive behavioral therapists  b  Try a smart phone marcelo like [employer] or mindfulness for beginners   i  Try curable for pain    c  Take a course in Mindfulness Based Stress Reduction (MBSR)   i  Vanessa hilario  Read or listen to an audiobook about it:  i  Mindfulness for beginners   ii  10% happier  iii   The happiness advantage   5) Social engagement:   a  Stay in touch with family and friends   b  Plan a few specific activities for your social health every week   c  Preeti iqbal local support group   d  Volunteer! www [email or web address] or call [phone]    MIND diet score:  1 point for each component      - Green leafy vegetables: at least 6 per week  - Other vegetable: at least 1 per day   - Berries: at least 2 per week  - Red meat: fewer than 4 per week   - Fish: at least 1 per week   - Poultry: at least 2 per week  - Beans: at least 3 per week  - Nuts: at least 5 per week  - Fast or fried food: less than 1 per week  - Olive oil   - Butter less: less than 1 table-spoon per day   - Cheese: less than 1 serving per week   - Pastries/sweets: less than 5 servings per week  - Alcohol: no more than 1 serving/ day

## 2021-08-26 NOTE — PROGRESS NOTES
Patient ID: Kimo Kaur is a 73 y o  male  Assessment/Plan:    Parkinson's disease ([facility] [address] )  78 y o  male w h/o HTN, HLD, chronic alcoholism, lumbar scoliosis, depression presents here as a follow up for parkinson's disease  Patient's symptoms has mostly been stable versus worsening in drooling, more stooped posture, shuffling gait, more falls since last visit  This time we discussed about the medication and patient is agreeable to start on Sinemet  Will see how patient does next visit if he continues to have increase in drooling we  may plan for Botox injection  will also think about starting him on rivastigmine for his cognitive impairment  Will hold for now      Plan-  · Start Sinemet 25/100 1 tab three times day (after breakfast, lunch and dinner)  · Ambulatory referral to PT   · He is not allowed to drive, will fill out PENDOT form  Things that we know are helpful for thinking and memory   1) Exercise program: gradually increase your physical activity over time  Start small and be patient  Aerobic (cardio) activity is best but incorporate balance, strength and flexibility training as well    a  Try to get at least 30min 3 times per week   2) Diet: Mediterranean diet (colorful fruits and vegetables, olive oil, fish, whole grains, very little red meet is any), MIND diet, anti-inflammatory diet  a  Stay well hydrated: drink 6-8 glasses of water per day   b  What's good for your heart is good for your brain  c  Avoid high-salt foods   3) Sleep: aim for at least 7-8 hours per night   a  Avoid alcohol and medications like Benadryl (Tylenol PM) or other sedating drugs  4) Stress management/mindfulness practice:   a  Talk with our social workers about finding a cognitive behavioral therapists  b  Try a smart phone marcelo like Positronics or mindfulness for beginners   i  Try curable for pain    c  Take a course in Mindfulness Based Stress Reduction (MBSR)   i  Vanessa Jacobs or listen to an audiobook about it:  i  Mindfulness for beginners   ii  10% happier  iii  The happiness advantage   5) Social engagement:   a  Stay in touch with family and friends   b  Plan a few specific activities for your social health every week   c  Nicolas Phan a local support group   d  Volunteer! www [email or web address] or call [phone]    MIND diet score:  1 point for each component  - Green leafy vegetables: at least 6 per week  - Other vegetable: at least 1 per day   - Berries: at least 2 per week  - Red meat: fewer than 4 per week   - Fish: at least 1 per week   - Poultry: at least 2 per week  - Beans: at least 3 per week  - Nuts: at least 5 per week  - Fast or fried food: less than 1 per week  - Olive oil   - Butter less: less than 1 table-spoon per day   - Cheese: less than 1 serving per week   - Pastries/sweets: less than 5 servings per week  - Alcohol: no more than 1 serving/ day         Diagnoses and all orders for this visit:    Parkinson's disease ([facility] )  -     Ambulatory referral to Physical Therapy; Future  -     carbidopa-levodopa (SINEMET)  mg per tablet; Take 1 tablet by mouth 3 (three) times a day           Subjective: To review, onset of symptoms around 2016, Pt has resting tremors in left hand, slowness, changes in facial expression, mask facies, change in voice, change in hand writing, clumsiness, depressed mood, constipation, memory trouble, visual hallucination  His tremors in left hand seems to be classic of parkinsonism which is mostly resting, increases when walking, he also has mild rigidity on distraction left arm> Rt arm, decrement in finger tap on left> Rt, stooped posture  Since his last visit,   Increase in drooling, shuffling more, He tries to walk in park, he fell about a week ago, he fell on bottom step  3 falls in last 4-6 months  he was also caught as DUI few weeks ago        Regarding motor symptoms:   Tremor: may be- left hand, he can stop it if thinks about it  Slowness-yes  Stiffness: yes  Dystonia: No  Changes in facial expression: yes  Changes in voice: yes  Changes in writing: yes   Changes in gait: yes-   Falls:  3 falls in last 6 months   Freezing: No  Trouble with swallowing: yes   Clumsiness/dexterity: yes  Drooling- yes      Regarding non-motor symptoms:   Anosmia: no   Constipation: yes, its better   Urinary sx: yes (nocturia)   Lightheadedness: No  Changes in Mood: yes  Trouble with sleep: Too much sleep,      REM behavior Disorder: daughter noted one time he yelled while sleeping  Memory trouble: yes,   Hallucinations: yes      The following portions of the patient's history were reviewed and updated as appropriate: allergies, current medications, past family history, past medical history, past social history, past surgical history and problem list          Objective:    Blood pressure 120/70, pulse 75, temperature 97 6 °F (36 4 °C), temperature source Temporal, height 5' 8" (1 727 m), weight 83 9 kg (185 lb)  Physical Exam  Vitals reviewed  Constitutional:       Comments: Mask facies    HENT:      Head: Normocephalic  Mouth/Throat:      Mouth: Mucous membranes are moist       Pharynx: Oropharynx is clear  Eyes:      Extraocular Movements: Extraocular movements intact  Pupils: Pupils are equal, round, and reactive to light  Cardiovascular:      Rate and Rhythm: Normal rate  Pulses: Normal pulses  Pulmonary:      Effort: Pulmonary effort is normal    Abdominal:      Palpations: Abdomen is soft  Musculoskeletal:         General: Normal range of motion  Cervical back: Normal range of motion  Skin:     General: Skin is warm  Capillary Refill: Capillary refill takes less than 2 seconds  Neurological:      Mental Status: He is alert  Psychiatric:         Mood and Affect: Mood normal          Neurological Examination:      Mental Status:  The patient was awake, alert, attentive, oriented to person, place, and time, able to tell me presidents name correctly  He has mask facies       Cranial Nerves:   I: smell Not tested   II: visual fields Full to confrontation  Pupils equal, round, reactive to light with normal accomodation  III,IV,VI: extraocular muscles EOMI, no nystagmus   V: masseter and pterygoid strength full  Sensation in the V1 through V3 distributions intact to pinprick and light touch bilaterally  VII: Face is symmetric with no weakness noted  VIII: Audition intact to finger rub bilaterally  IX/X: Uvula midline  Soft palate elevation symmetric  XI: Trapezius and SCM strength 5/5 B/L  XII: Tongue midline with no atrophy or fasciculations with appropriate movement       Motor Examination:   Resting Tremors noted in left hand  No pronator drift  Bulk: Normal  No atrophy Tone: cog wheel rigidity in Left>Rt arm  Fasciculations: None                   Deltoid Biceps Triceps WE   WF   FF IO     Right        5         5          5         5      5      5   5        Left           5        5          5          5      5     5   5                        IP        Quad   Ham     TA       Gastroc   Right      5            5          5         5                5  Left         5            5         5         5                5         Reflexes:                   Biceps Brachioradialis Triceps Patella Achilles Plantars   Right          2+            2+                  2+        2+       1+         Down   Left            2+             2+                 2+         2+       1+         Down      Clonus: None     Coordination: Taping on left hand is slightly slower and decrement compared to right  Patient able to perform normal finger-to-nose with no tremors       Sensory: Normal sensation to light touch, decrease vibration in b/l feet compared to knees      Gait: Stooped posture, his tremors comes in when he is walking in his left hand/arm  Mild Romberg sway       ROS:    Review of Systems   Constitutional: Negative  Negative for appetite change and fever  HENT: Negative  Negative for hearing loss, tinnitus, trouble swallowing and voice change  Eyes: Negative  Negative for photophobia and pain  Respiratory: Negative  Negative for shortness of breath  Cardiovascular: Negative  Negative for palpitations  Gastrointestinal: Negative  Negative for nausea and vomiting  Endocrine: Negative  Negative for cold intolerance  Genitourinary: Negative  Negative for dysuria, frequency and urgency  Musculoskeletal: Positive for back pain  Negative for myalgias and neck pain  Skin: Negative  Negative for rash  Neurological: Positive for tremors  Negative for dizziness, seizures, syncope, facial asymmetry, speech difficulty, weakness, light-headedness, numbness and headaches  Patient stated that he has left hand tremors  Hematological: Negative  Does not bruise/bleed easily  Psychiatric/Behavioral: Negative  Negative for confusion, hallucinations and sleep disturbance

## 2021-08-27 ENCOUNTER — PATIENT OUTREACH (OUTPATIENT)
Dept: FAMILY MEDICINE CLINIC | Facility: CLINIC | Age: 71
End: 2021-08-27

## 2021-08-27 ENCOUNTER — OFFICE VISIT (OUTPATIENT)
Dept: FAMILY MEDICINE CLINIC | Facility: CLINIC | Age: 71
End: 2021-08-27
Payer: MEDICARE

## 2021-08-27 ENCOUNTER — TELEPHONE (OUTPATIENT)
Dept: NEUROLOGY | Facility: CLINIC | Age: 71
End: 2021-08-27

## 2021-08-27 VITALS
SYSTOLIC BLOOD PRESSURE: 108 MMHG | DIASTOLIC BLOOD PRESSURE: 70 MMHG | BODY MASS INDEX: 29.55 KG/M2 | HEIGHT: 68 IN | HEART RATE: 102 BPM | OXYGEN SATURATION: 98 % | RESPIRATION RATE: 14 BRPM | WEIGHT: 195 LBS

## 2021-08-27 DIAGNOSIS — E78.2 MIXED HYPERLIPIDEMIA: ICD-10-CM

## 2021-08-27 DIAGNOSIS — F33.41 RECURRENT MAJOR DEPRESSIVE DISORDER, IN PARTIAL REMISSION (HCC): ICD-10-CM

## 2021-08-27 DIAGNOSIS — G20 PARKINSON'S DISEASE (HCC): Primary | ICD-10-CM

## 2021-08-27 DIAGNOSIS — I10 ESSENTIAL HYPERTENSION: ICD-10-CM

## 2021-08-27 PROCEDURE — 99214 OFFICE O/P EST MOD 30 MIN: CPT | Performed by: NURSE PRACTITIONER

## 2021-08-27 NOTE — PATIENT INSTRUCTIONS
Parkinson Disease   WHAT YOU NEED TO KNOW:   Parkinson disease (PD) is a long-term movement disorder  The brain cells that control movement start to die and cause changes in how you move, feel, and act  Even though PD may progress and have a severe impact on your daily life, it is not a life-threatening disease  DISCHARGE INSTRUCTIONS:   Call your local emergency number (911 in the [address]) or have someone call if:   · You feel like hurting yourself or others  · You have chest pain or shortness of breath  · You become confused, or you have trouble speaking  Return to the emergency department if:   · You feel lightheaded, dizzy, or faint  · You have weakness in an arm or leg  · You have dizziness, a severe headache, or vision changes  Call your doctor or neurologist if:   · You have a fever  · You are not sleeping well or you sleep more than usual     · You cannot eat or are eating more than usual     · You feel that your condition is getting worse  · You have new symptoms since your last appointment  · Your sad feelings or thoughts change the way you function during the day  · You have questions or concerns about your condition or care  Medicines:   · Anti-Parkinson medicines  are used to improve movement problems, such as muscle stiffness, twitches, and restlessness  Your healthcare provider may use several types of medicine to help manage your symptoms  · Take your medicine as directed  Contact your healthcare provider if you think your medicine is not helping or if you have side effects  Tell him of her if you are allergic to any medicine  Keep a list of the medicines, vitamins, and herbs you take  Include the amounts, and when and why you take them  Bring the list or the pill bottles to follow-up visits  Carry your medicine list with you in case of an emergency  Manage PD:   · Do not eat foods that are high in protein or dairy    They can cause problems with how some of your medicine works  Ask your healthcare provider how much protein and dairy is safe to eat  He or she may tell you to eat foods high in fiber to make it easier to have a bowel movement  Examples are cereals, beans, vegetables, and whole-grain breads  Ask if you need to be on a special diet  · Do not drive  unless your healthcare provider says it is okay  · Exercise as directed  A physical therapist teaches you exercises to help improve movement and strength, and to decrease pain  This may help you control your body movements, and keep your balance  · Talk to your healthcare provider about therapy  He or she may recommend any of the following:    ? Occupational therapy  is used to teach skills that help with daily activities  Your occupational therapist may help you choose equipment to help you at home and work  He or she can also suggest ways to keep your home and workplace safe  ? Speech therapy  may be used to help improve your ability to talk or swallow  ? Mental healthy therapy  can be used to help you talk about your feeling about PD  Your family members may attend meetings to learn new ways to take better care of both you and themselves  Follow up with your doctor or neurologist as directed:  Write down your questions so you remember to ask them during your visits  For support and more information:   · American Parkinson [employer] Association  [address]  [place] , [address]   Phone: 6- 723 - [phone]  Web Address: TrustGo  org     · [address] / [address] [place] [address] Place  [place], [place] , [facility],  [address]  [address] [place] [address] , [address]   Phone: [phone]  Phone: [phone]  Web Address: http://[employer]/  [address] [address] 2021 Information is for End User's use only and may not be sold, redistributed or otherwise used for commercial purposes   All illustrations and images included in CareNotes® are the copyrighted property of A D A M , Inc  or Ruddy Fox   The above information is an  only  It is not intended as medical advice for individual conditions or treatments  Talk to your doctor, nurse or pharmacist before following any medical regimen to see if it is safe and effective for you

## 2021-08-27 NOTE — TELEPHONE ENCOUNTER
Patient's daughter Marysol Dubon stated she was reviewing the office notes from yesterday's appt and wanted to provide clarification on something that was incorrect in patient's office note  She stated the office note includes "he was also caught as DUI few weeks ago"    She is requesting this portion of the chart be updated or addended  She stated that the patient was pulled over because he was driving too slow and the patient was perceived to be under the influence however he was not intoxicated  She is concerned that this wording is incorrect and could be used against him in the future  Her call back is: [phone] to discuss

## 2021-08-27 NOTE — PROGRESS NOTES
OP CM called to pt and he states he is not interested in any services and his friend will be driving him to appts

## 2021-08-27 NOTE — ASSESSMENT & PLAN NOTE
Currently following with psych for this  Denies any increased depressive symptoms since his dose of Zoloft was decreased

## 2021-08-27 NOTE — TELEPHONE ENCOUNTER
Correction/Addendum on my note from yesterday (8/27/21)    I wrote patient was caught as DUI few weeks ago, instead patient was pulled over as he was driving too slow and he was perceived to be under the influence, he was not intoxicated

## 2021-08-27 NOTE — ASSESSMENT & PLAN NOTE
Patient will begin Sinemet today  Though patient stated he does not wish to be contacted by Case Management I will place the referral anyway so that they can contact him about transportation options that may be available to him to use for future appointments  Patient also reports he will see PT in the near future

## 2021-08-27 NOTE — PROGRESS NOTES
Assessment and Plan:    Problem List Items Addressed This Visit        Cardiovascular and Mediastinum    Essential hypertension     Well controlled on current regimen  Nervous and Auditory    Parkinson's disease [facility]) - Primary     Patient will begin Sinemet today  Though patient stated he does not wish to be contacted by Case Management I will place the referral anyway so that they can contact him about transportation options that may be available to him to use for future appointments  Patient also reports he will see PT in the near future  Relevant Orders    Ambulatory referral to social work care management program       Other    Recurrent major depressive disorder, in [facility] ([facility] )     Currently following with psych for this  Denies any increased depressive symptoms since his dose of Zoloft was decreased  Mixed hyperlipidemia     Well controlled on current regimen  Diagnoses and all orders for this visit:    Parkinson's disease ([facility] )  -     Ambulatory referral to social work care management program; Future    Essential hypertension    Recurrent major depressive disorder, in partial remission ([facility] )    Mixed hyperlipidemia              Subjective:      Patient ID: Yfn Pascal is a 73 y o  male  CC:    Chief Complaint   Patient presents with    Follow-up     Patient present today for his routine follow up  HPI:    Hypertension:  Well controlled on current dose of Toprol-XL  Patient denies any lightheadedness, dizziness, or orthostasis  Parkinson's disease:  Patient was seen in the office by Neurology yesterday and started on Sinemet 25/100 1 tablet 3 times a day  He was also referred to PT  Patient was also noted to have increased drooling related to diagnosis and it was noted in the neurology note they may consider Botox injections in the future if the drooling continues or gets worse    It was also noted they may consider starting the patient on rivastigmine in the future as well to help with his cognitive impairment  The patient does have a follow-up scheduled with Neurology in March 2022  The patient states he will begin taking Sinemet today when he picks it up from the pharmacy  Recurrent MDD: Patient currently meets with psych regularly regarding this  Patient had a noted incident recently where he was pulled over by police in [place] and they felt that the patient may be under the influence of drugs or alcohol because he was driving under the speed limit and seemed extremely confused when speaking with police  It was noted that the patient did pass a Breathalyzer test however, the patient will still need to report to court in regard to this incident  It was also noted that after this incident the patient's Zoloft dose was decreased from 100 mg daily to 50 mg daily as it was felt this may have been contributing to his confusion and lethargy  It was also noted by both psych and Neurology that the patient will no longer be able to drive related to this incident  The patient was unaware that he should not be driving anymore  It was noted that the neurologist was submitting paperwork to [facility] to have patient's license taken away  The patient states he will make arrangements to have friends and family drive him to appointments in the future he does not currently want to speak with case management regarding this  Patient denies any anhedonia, SI, sleep changes, or appetite changes  Hyperlipidemia:  Well controlled on current dose of Crestor  The following portions of the patient's history were reviewed and updated as appropriate: allergies, current medications, past family history, past medical history, past social history, past surgical history and problem list       Review of Systems   Constitutional: Negative for chills and fever  HENT: Negative for ear pain and sore throat           Increase salivation   Eyes: Negative for pain and visual disturbance  Respiratory: Negative for cough, chest tightness, shortness of breath and wheezing  Cardiovascular: Negative for chest pain, palpitations and leg swelling  Gastrointestinal: Negative for abdominal pain, constipation, diarrhea, nausea and vomiting  Endocrine: Negative for cold intolerance and heat intolerance  Genitourinary: Negative for decreased urine volume, dysuria and hematuria  Musculoskeletal: Positive for back pain (chronic lower back pain) and gait problem (shuffling gait )  Negative for arthralgias and myalgias  Skin: Negative for color change and rash  Allergic/Immunologic: Negative for environmental allergies  Neurological: Positive for tremors  Negative for dizziness, seizures, syncope, weakness, light-headedness, numbness and headaches  Hematological: Negative for adenopathy  Psychiatric/Behavioral: Negative for confusion  The patient is not nervous/anxious  All other systems reviewed and are negative  Data to review:       Objective:    Vitals:    08/27/21 0921   BP: 108/70   BP Location: Right arm   Patient Position: Sitting   Cuff Size: Large   Pulse: 102   Resp: 14   SpO2: 98%   Weight: 88 5 kg (195 lb)   Height: 5' 8" (1 727 m)        Physical Exam  Vitals and nursing note reviewed  Constitutional:       General: He is not in acute distress  Appearance: Normal appearance  He is well-developed  He is not ill-appearing  HENT:      Head: Normocephalic and atraumatic  Eyes:      Conjunctiva/sclera: Conjunctivae normal    Cardiovascular:      Rate and Rhythm: Normal rate and regular rhythm  Pulses: Normal pulses  Carotid pulses are 2+ on the right side and 2+ on the left side  Posterior tibial pulses are 2+ on the right side and 2+ on the left side  Heart sounds: Normal heart sounds  No murmur heard  Pulmonary:      Effort: Pulmonary effort is normal  No respiratory distress        Breath sounds: Normal breath sounds  No wheezing or rhonchi  Abdominal:      General: Abdomen is flat  Bowel sounds are normal  There is no distension  Palpations: Abdomen is soft  Tenderness: There is no abdominal tenderness  There is no guarding  Musculoskeletal:         General: Normal range of motion  Cervical back: Normal range of motion and neck supple  Right lower leg: No edema  Left lower leg: No edema  Comments: Patient has noted stooped posture and shuffling gait consistent with Parkinson's disease  Skin:     General: Skin is warm and dry  Capillary Refill: Capillary refill takes less than 2 seconds  Neurological:      General: No focal deficit present  Mental Status: He is alert and oriented to person, place, and time  Psychiatric:         Mood and Affect: Mood normal          Behavior: Behavior normal          Thought Content:  Thought content normal          Judgment: Judgment normal

## 2021-09-03 DIAGNOSIS — I10 ESSENTIAL HYPERTENSION: ICD-10-CM

## 2021-09-03 RX ORDER — METOPROLOL SUCCINATE 50 MG/1
TABLET, EXTENDED RELEASE ORAL
Qty: 90 TABLET | Refills: 0 | Status: SHIPPED | OUTPATIENT
Start: 2021-09-03 | End: 2022-01-24 | Stop reason: SDUPTHER

## 2021-09-07 ENCOUNTER — EVALUATION (OUTPATIENT)
Dept: PHYSICAL THERAPY | Facility: REHABILITATION | Age: 71
End: 2021-09-07
Payer: MEDICARE

## 2021-09-07 DIAGNOSIS — G20 PARKINSON'S DISEASE (HCC): ICD-10-CM

## 2021-09-07 PROCEDURE — 97110 THERAPEUTIC EXERCISES: CPT | Performed by: PHYSICAL THERAPIST

## 2021-09-07 PROCEDURE — 97162 PT EVAL MOD COMPLEX 30 MIN: CPT | Performed by: PHYSICAL THERAPIST
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
## 2021-09-08 ENCOUNTER — HOSPITAL ENCOUNTER (OUTPATIENT)
Dept: RADIOLOGY | Facility: HOSPITAL | Age: 71
Discharge: HOME | End: 2021-09-08
Payer: MEDICARE

## 2021-09-08 ENCOUNTER — OFFICE VISIT (OUTPATIENT)
Dept: FAMILY MEDICINE CLINIC | Facility: CLINIC | Age: 71
End: 2021-09-08
Payer: MEDICARE

## 2021-09-08 ENCOUNTER — APPOINTMENT (OUTPATIENT)
Dept: LAB | Facility: HOSPITAL | Age: 71
End: 2021-09-08
Payer: MEDICARE

## 2021-09-08 VITALS
HEART RATE: 80 BPM | WEIGHT: 201.6 LBS | SYSTOLIC BLOOD PRESSURE: 134 MMHG | HEIGHT: 68 IN | BODY MASS INDEX: 30.55 KG/M2 | DIASTOLIC BLOOD PRESSURE: 72 MMHG | RESPIRATION RATE: 16 BRPM

## 2021-09-08 DIAGNOSIS — M19.90 ARTHRITIS: ICD-10-CM

## 2021-09-08 DIAGNOSIS — Z23 ENCOUNTER FOR IMMUNIZATION: ICD-10-CM

## 2021-09-08 DIAGNOSIS — G89.29 CHRONIC MIDLINE LOW BACK PAIN WITHOUT SCIATICA: ICD-10-CM

## 2021-09-08 DIAGNOSIS — I10 ESSENTIAL HYPERTENSION: ICD-10-CM

## 2021-09-08 DIAGNOSIS — Z79.82 LONG-TERM USE OF ASPIRIN THERAPY: ICD-10-CM

## 2021-09-08 DIAGNOSIS — E55.9 VITAMIN D INSUFFICIENCY: ICD-10-CM

## 2021-09-08 DIAGNOSIS — G20 PARKINSON'S DISEASE (HCC): ICD-10-CM

## 2021-09-08 DIAGNOSIS — M41.26 OTHER IDIOPATHIC SCOLIOSIS, LUMBAR REGION: ICD-10-CM

## 2021-09-08 DIAGNOSIS — I67.9 CEREBROVASCULAR DISEASE: ICD-10-CM

## 2021-09-08 DIAGNOSIS — Z12.12 SCREENING FOR COLORECTAL CANCER: ICD-10-CM

## 2021-09-08 DIAGNOSIS — Z12.11 SCREENING FOR COLORECTAL CANCER: ICD-10-CM

## 2021-09-08 DIAGNOSIS — Z11.59 NEED FOR HEPATITIS C SCREENING TEST: ICD-10-CM

## 2021-09-08 DIAGNOSIS — M54.50 CHRONIC MIDLINE LOW BACK PAIN WITHOUT SCIATICA: ICD-10-CM

## 2021-09-08 DIAGNOSIS — Z12.11 SCREENING FOR COLON CANCER: ICD-10-CM

## 2021-09-08 DIAGNOSIS — M19.90 ARTHRITIS: Primary | ICD-10-CM

## 2021-09-08 PROCEDURE — 86038 ANTINUCLEAR ANTIBODIES: CPT

## 2021-09-08 PROCEDURE — 82306 VITAMIN D 25 HYDROXY: CPT

## 2021-09-08 PROCEDURE — 72072 X-RAY EXAM THORAC SPINE 3VWS: CPT

## 2021-09-08 PROCEDURE — 86803 HEPATITIS C AB TEST: CPT

## 2021-09-08 PROCEDURE — 86618 LYME DISEASE ANTIBODY: CPT

## 2021-09-08 PROCEDURE — 72110 X-RAY EXAM L-2 SPINE 4/>VWS: CPT

## 2021-09-08 PROCEDURE — 36415 COLL VENOUS BLD VENIPUNCTURE: CPT

## 2021-09-08 PROCEDURE — 99214 OFFICE O/P EST MOD 30 MIN: CPT | Performed by: FAMILY MEDICINE

## 2021-09-08 PROCEDURE — 86430 RHEUMATOID FACTOR TEST QUAL: CPT

## 2021-09-08 RX ORDER — CELECOXIB 200 MG/1
CAPSULE ORAL
Qty: 30 CAPSULE | Refills: 5 | Status: SHIPPED | OUTPATIENT
Start: 2021-09-08 | End: 2021-09-28 | Stop reason: ALTCHOICE

## 2021-09-08 NOTE — PATIENT INSTRUCTIONS
Do not take over-the-counter ibuprofen/ Motrin/ Aleve   Patient may use Tylenol over-the-counter  Start Celebrex 200 mg 1 daily with food for arthritis   Complete x-rays and blood work  Follow-up physical therapy  Follow all specialist per their instructions   Return at scheduled appointment sooner if needed

## 2021-09-08 NOTE — ASSESSMENT & PLAN NOTE
Blood work is ordered x-rays ordered    Patient on chronic aspirin because of this will start Romero 2, Celebrex

## 2021-09-08 NOTE — PROGRESS NOTES
Assessment and Plan:   1  Arthritis  2  Chronic midline low back pain  3  Chronic scoliosis   Patient  To follow-up with physical therapy x-ray is ordered blood work is ordered to rule out connective tissue disease and Lyme  4  Vitamin-D insufficiency, blood work is ordered  5  Hypertension, stable continue present therapy   6  Cerebrovascular disease, continue aspirin/ statin therapy  7  Chronic aspirin therapy, because of this Romero 2 inhibitor is safer, Celebrex is ordered as above   8  Parkinson, stable follows with Neurology  9  Colon cancer screening, Cologuard is ordered patient has refused colonoscopy   10  Hepatitis-C screening discussed order placed  11  Return at scheduled appointment sooner if needed  Addendum:   As patient was leaving patient asked me 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  I explained I do not believe that he is capable of doing this and I 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 doing any landscaping work      Problem List Items Addressed This Visit        Cardiovascular and Mediastinum    Essential hypertension      Stable continue present therapy         Cerebrovascular disease      Continue aspirin/ statin therapy            Nervous and Auditory    Parkinson's disease ([address] )      Follow-up with neurology            Musculoskeletal and Integument    Idiopathic scoliosis of lumbar spine      Following physical therapy x-rays ordered         Relevant Orders    XR spine thoracic 3 vw    XR spine lumbar minimum 4 views non injury    Arthritis - Primary      Blood work is ordered x-rays ordered    Patient on chronic aspirin because of this will start Romero 2, Celebrex         Relevant Medications    celecoxib (CeleBREX) 200 mg capsule    Other Relevant Orders    RF Screen w/ Reflex to Titer    Lyme Total Antibody Profile with reflex to WB    VIC Screen w/ Reflex to Titer/Pattern    Vitamin D 25 hydroxy       Other Screening for colon cancer      Cologuard ordered         Relevant Orders    Cologuard    Chronic midline low back pain without sciatica      Will check x-ray, patient is seeing physical therapy, Celebrex started as above         Relevant Orders    RF Screen w/ Reflex to Titer    Lyme Total Antibody Profile with reflex to WB    VIC Screen w/ Reflex to Titer/Pattern    Vitamin D 25 hydroxy    XR spine thoracic 3 vw    XR spine lumbar minimum 4 views non injury    Long-term use of aspirin therapy      Because of this needs Romero 2           Other Visit Diagnoses     Need for hepatitis C screening test        Relevant Orders    Hepatitis C Antibody ([facility])    Encounter for immunization        Screening for colorectal cancer        Vitamin D insufficiency        Relevant Orders    RF Screen w/ Reflex to Titer    Lyme Total Antibody Profile with reflex to WB    VIC Screen w/ Reflex to Titer/Pattern    Vitamin D 25 hydroxy                 Diagnoses and all orders for this visit:    Arthritis  -     RF Screen w/ Reflex to Titer; Future  -     Lyme Total Antibody Profile with reflex to WB; Future  -     VIC Screen w/ Reflex to Titer/Pattern; Future  -     Vitamin D 25 hydroxy; Future  -     celecoxib (CeleBREX) 200 mg capsule; Take 1 capsule daily with food for arthritis pain    Need for hepatitis C screening test  -     Hepatitis C Antibody ([facility]); Future    Encounter for immunization    Screening for colorectal cancer    Chronic midline low back pain without sciatica  -     RF Screen w/ Reflex to Titer; Future  -     Lyme Total Antibody Profile with reflex to WB; Future  -     VIC Screen w/ Reflex to Titer/Pattern; Future  -     Vitamin D 25 hydroxy; Future  -     XR spine thoracic 3 vw; Future  -     XR spine lumbar minimum 4 views non injury;  Future    Other idiopathic scoliosis, lumbar region  -     XR spine thoracic 3 vw; Future  -     XR spine lumbar minimum 4 views non injury; Future    Parkinson's disease [facility])    Essential hypertension    Cerebrovascular disease    Screening for colon cancer  -     Cologuard    Vitamin D insufficiency  -     RF Screen w/ Reflex to Titer; Future  -     Lyme Total Antibody Profile with reflex to WB; Future  -     VIC Screen w/ Reflex to Titer/Pattern; Future  -     Vitamin D 25 hydroxy; Future    Long-term use of aspirin therapy    Other orders  -     Cancel: PNEUMOCOCCAL POLYSACCHARIDE VACCINE 23-VALENT =>3YO SQ IM  -     Cancel: TDAP VACCINE GREATER THAN OR EQUAL TO 8YO IM  -     Cancel: Ambulatory referral to Gastroenterology; Future              Subjective:      Patient ID: Janene Kaye is a 73 y o  male  CC:    Chief Complaint   Patient presents with    Follow-up     pt here for a follow up  GABRIELLA mireles       HPI:     Patient 1 talk about "arthritis"  Multiple years ago Dr Jovany Castro told him he had arthritis  I reviewed chart last time he had an x-ray after surgery is 2018  Patient is seeing physical therapy for back pain  No x-rays were completed recently  Patient denies any history of rheumatoid arthritis lupus connective tissue disease no tick bite or rash  The following portions of the patient's history were reviewed and updated as appropriate: allergies, current medications, past family history, past medical history, past social history, past surgical history and problem list       Review of Systems   Constitutional: Negative  HENT: Negative  Eyes: Negative  Respiratory: Negative  Cardiovascular: Negative  Gastrointestinal: Negative  Endocrine: Negative  Genitourinary: Negative  Musculoskeletal:        HPI   Skin: Negative  Allergic/Immunologic: Negative  Neurological: Negative  Hematological: Negative  Psychiatric/Behavioral: Negative            Data to review:       Objective:    Vitals:    09/08/21 1257   BP: 134/72   BP Location: Left arm   Patient Position: Sitting   Cuff Size: Large   Pulse: 80 Resp: 16   Weight: 91 4 kg (201 lb 9 6 oz)   Height: 5' 8" (1 727 m)        Physical Exam  Vitals and nursing note reviewed  Constitutional:       Appearance: Normal appearance  HENT:      Head: Normocephalic and atraumatic  Eyes:      General: No scleral icterus  Cardiovascular:      Rate and Rhythm: Normal rate and regular rhythm  Heart sounds: Normal heart sounds  Pulmonary:      Effort: Pulmonary effort is normal       Breath sounds: Normal breath sounds  Abdominal:      General: Bowel sounds are normal       Palpations: Abdomen is soft  Tenderness: There is no abdominal tenderness  Musculoskeletal:         General: No deformity  Cervical back: Neck supple  No rigidity  Comments: Negative arthritic deformities sign hands negative "hot joints"  Skin:     General: Skin is warm and dry  Neurological:      General: No focal deficit present  Mental Status: He is alert     Psychiatric:         Mood and Affect: Mood normal

## 2021-09-09 ENCOUNTER — TELEPHONE (OUTPATIENT)
Dept: FAMILY MEDICINE CLINIC | Facility: CLINIC | Age: 71
End: 2021-09-09

## 2021-09-09 ENCOUNTER — OFFICE VISIT (OUTPATIENT)
Dept: PHYSICAL THERAPY | Facility: REHABILITATION | Age: 71
End: 2021-09-09
Payer: MEDICARE

## 2021-09-09 DIAGNOSIS — G20 PARKINSON'S DISEASE (HCC): Primary | ICD-10-CM

## 2021-09-09 LAB
25(OH)D3 SERPL-MCNC: 61.5 NG/ML (ref 30–100)
B BURGDOR IGG+IGM SER-ACNC: -15
HCV AB SER QL: NORMAL
RHEUMATOID FACT SER QL LA: NEGATIVE

## 2021-09-09 PROCEDURE — 97110 THERAPEUTIC EXERCISES: CPT | Performed by: PHYSICAL THERAPIST

## 2021-09-09 PROCEDURE — 97116 GAIT TRAINING THERAPY: CPT | Performed by: PHYSICAL THERAPIST

## 2021-09-09 PROCEDURE — 97112 NEUROMUSCULAR REEDUCATION: CPT | Performed by: PHYSICAL THERAPIST

## 2021-09-09 RX ORDER — MELATONIN
1000 DAILY
COMMUNITY

## 2021-09-09 NOTE — TELEPHONE ENCOUNTER
PT CALLED IN AND STATED HE SAW DR BRUNNER ON 9/8/21 AND ON HIS AVS IT STATES TO STOP TAKING THE VITAMIN D3 AND HE DOESN'T REMEMBER IF THAT'S WHAT DR  TALKED TO HIM ABOUT THAT DURING THE VISIT, PLEASE CALL PT WITH PROVIDERS RECOMMENDATIONS, THANK YOU!

## 2021-09-09 NOTE — PROGRESS NOTES
Daily Note     Today's date: 2021  Patient name: Emily Shipley  : 1950  MRN: [id number]  Referring provider: Malvin You MD  Dx:   Encounter Diagnosis     ICD-10-CM    1  Parkinson's disease ([facility] [address] )  G20                   Subjective: Reports starting Celebrex yesterday  States his joint pain is feeling better from it  Objective: See treatment diary below      Assessment: Tolerated treatment well  Pt able to considerably improve his gait quality when verbally and visually cued to increase step amplitude  Gait quickly declines when distracted  Responded well to use of metornome with improved cadance and ability to maintain higher amplitude steps  Reported fatigue and minor instability with changing gait amplitude, but no pain increase today  Cues needed to improve forward weight shift with rising from chair  Good performance with turns and backwards walking today when cues provided  Patient would benefit from continued PT      Plan: Continue per plan of care  Precautions: low back pain       Manuals                                             Neuro Re-Ed         STS  With big lean fwd, arms overhead in standing  12x           Turns  Walk turn around cone 20x        Backwards walk 20ft x8        Floor ladder                                     Ther Ex         Scifit  L1 5min warm up        Side stepping band OTB 2 laps in //bars                                                              Ther Activity                           Gait Training         Overground  With and w/o metronome (88bpm) cues for big steps 20min         Biodex Treadmill          Modalities

## 2021-09-10 LAB — RYE IGE QN: NEGATIVE

## 2021-09-13 ENCOUNTER — OFFICE VISIT (OUTPATIENT)
Dept: PHYSICAL THERAPY | Facility: REHABILITATION | Age: 71
End: 2021-09-13
Payer: MEDICARE

## 2021-09-13 DIAGNOSIS — G20 PARKINSON'S DISEASE (HCC): Primary | ICD-10-CM

## 2021-09-13 PROCEDURE — 97112 NEUROMUSCULAR REEDUCATION: CPT | Performed by: PHYSICAL THERAPIST

## 2021-09-13 NOTE — PROGRESS NOTES
Daily Note     Today's date: 2021  Patient name: Kimo Kaur  : 1950  MRN: [id number]  Referring provider: Lisa Feng MD  Dx:   Encounter Diagnosis     ICD-10-CM    1  Parkinson's disease ([address] )  [place]        Start Time: 1330  Stop Time: 1418  Total time in clinic (min): 48 minutes    Subjective: States that he was mowing the yard yesterday and that it caused his back to flair up  He has had no falls and has been doing his exercises at home he states  He also states he has not heard anything from his job application yet  Objective: See treatment diary below      Assessment: Continuing to have short steps and shuffling at times, requiring cueing throughout for larger amplitude movements with arm swings as well during gait  He is fatiguing throughout the session asking to have seated rest breaks  Responds well to cueing for larger steps and larger amplitude turns while walking  Did not have any losses of balance while turning, increased time when turning 360 degrees to the left  Continue to progress him with activities based at promoting larger amplitude movements  Requiring frequent cueing for upright posture and shoulders back during session  Plan: Continue per plan of care  Precautions: low back pain       Manuals                        Neuro Re-Ed     STS  With big lean fwd, arms overhead in standing  12x    Treadmill 1 1 mph, both hands, moderate cueing to maintain pace with treadmill     Solostep 400 feet with 360 degree turns  2' cone Side steps in solo 25 feet 6 laps  6' hurdles 25 feet, 5 laps   Cone weaves in solostep 3 min, 25 feet laps    In ll bars, side steps over airex 4 min   In ll bars, step ups 8' 2x 1 min trials      Turns  Walk turn around cone 20x    Backwards walk 20ft x8    Floor ladder                     Ther Ex     Scifit  L1 5min warm up Heel raises off turf 3 x 45 seconds  Sci fit Lv 2, 7 min at end of session, cueing to increase speed   Side stepping band OTB 2 laps in //bars                                  Ther Activity               Gait Training     Overground  With and w/o metronome (88bpm) cues for big steps 20min     Biod[employer]ktiv[employer] Treadmill      Modalities

## 2021-09-15 ENCOUNTER — OFFICE VISIT (OUTPATIENT)
Dept: PHYSICAL THERAPY | Facility: REHABILITATION | Age: 71
End: 2021-09-15
Payer: MEDICARE

## 2021-09-15 DIAGNOSIS — G20 PARKINSON'S DISEASE (HCC): Primary | ICD-10-CM

## 2021-09-15 PROCEDURE — 97112 NEUROMUSCULAR REEDUCATION: CPT | Performed by: PHYSICAL THERAPIST

## 2021-09-15 NOTE — PROGRESS NOTES
Daily Note     Today's date: 9/15/2021  Patient name: Kurt Andrew  : 1950  MRN: [id number]  Referring provider: Rhianna Donnelly MD  Dx:   Encounter Diagnosis     ICD-10-CM    1  Parkinson's disease ([facility] [address] )  G20        Subjective: He is feeling better today from his back due to mowing the lawn the other day  States he has been moving better and that he didn't get to do his walk or exercises this morning  Objective: See treatment diary below      Assessment: Reuqiring frequent cueing for larger amplitude steps as well as upright posturewhile walking  Demonstrated delayed stepping strategy during slip training, no falls, he takes shorter steps  With larger amplitude perturbations he responded with slightly larger steps   Responding well to timed trails that promote larger amplitude stepping and turns  Will continue to progress him with perturbation training in future visits as well as movement promoting larger amplitude motions  Plan: Continue per plan of care  Precautions: low back pain       Manuals 9/9 9/13 9/15/21                           Neuro Re-Ed      STS  With big lean fwd, arms overhead in standing  12x  Treadmill 1 1 mph, both hands, moderate cueing to maintain pace with treadmill     Solostep 400 feet with 360 degree turns  2' cone Side steps in solo 25 feet 6 laps  6' hurdles 25 feet, 5 laps   Cone weaves in solostep 3 min, 25 feet laps    In ll bars, side steps over airex 4 min   In ll bars, step ups 8' 2x 1 min trials    Biodex treadmill, 1 1-1 3 mph use of one hand with cueing for larger steps       Solostep 400 feet with 360 degree turns     Solostep with 360 turns 100 feet timed trials:  - 42s, 37s, 43s, 41s  2' Cone step overs, 6 laps    Incline step ups, 3 min fwd/bwd  Posterior perturbation training, 2 min   Slip training with slip  fwd/bwd/lat, 10 min    Turns  Walk turn around cone 20x     Backwards walk 20ft x8     Floor ladder                         Ther Ex Scifit  L1 5min warm up Heel raises off turf 3 x 45 seconds  Sci fit Lv 2, 7 min at end of session, cueing to increase speed Sci fit lv 4, 8 min at end of session      Side stepping band OTB 2 laps in //bars                                         Ther Activity                  Gait Training      Overground  With and w/o metronome (88bpm) cues for big steps 20min      Biodex Treadmill       Modalities

## 2021-09-20 ENCOUNTER — TRANSITIONAL CARE MANAGEMENT (OUTPATIENT)
Dept: FAMILY MEDICINE CLINIC | Facility: CLINIC | Age: 71
End: 2021-09-20

## 2021-09-21 ENCOUNTER — EVALUATION (OUTPATIENT)
Dept: PHYSICAL THERAPY | Facility: REHABILITATION | Age: 71
End: 2021-09-21
Payer: MEDICARE

## 2021-09-21 DIAGNOSIS — G20 PARKINSON'S DISEASE (HCC): Primary | ICD-10-CM

## 2021-09-21 PROCEDURE — 97112 NEUROMUSCULAR REEDUCATION: CPT | Performed by: PHYSICAL THERAPIST

## 2021-09-21 PROCEDURE — 97110 THERAPEUTIC EXERCISES: CPT | Performed by: PHYSICAL THERAPIST
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
## 2021-09-23 ENCOUNTER — APPOINTMENT (OUTPATIENT)
Dept: PHYSICAL THERAPY | Facility: REHABILITATION | Age: 71
End: 2021-09-23
Payer: MEDICARE

## 2021-09-28 ENCOUNTER — OFFICE VISIT (OUTPATIENT)
Dept: FAMILY MEDICINE CLINIC | Facility: CLINIC | Age: 71
End: 2021-09-28
Payer: MEDICARE

## 2021-09-28 VITALS
SYSTOLIC BLOOD PRESSURE: 110 MMHG | DIASTOLIC BLOOD PRESSURE: 70 MMHG | TEMPERATURE: 98.1 F | HEART RATE: 76 BPM | HEIGHT: 68 IN | WEIGHT: 198 LBS | BODY MASS INDEX: 30.01 KG/M2

## 2021-09-28 DIAGNOSIS — D64.9 ANEMIA, UNSPECIFIED TYPE: ICD-10-CM

## 2021-09-28 DIAGNOSIS — D72.829 LEUKOCYTOSIS, UNSPECIFIED TYPE: ICD-10-CM

## 2021-09-28 DIAGNOSIS — G20 PARKINSON'S DISEASE (HCC): Primary | ICD-10-CM

## 2021-09-28 DIAGNOSIS — M54.41 CHRONIC LOW BACK PAIN WITH BILATERAL SCIATICA, UNSPECIFIED BACK PAIN LATERALITY: ICD-10-CM

## 2021-09-28 DIAGNOSIS — G89.29 CHRONIC LOW BACK PAIN WITH BILATERAL SCIATICA, UNSPECIFIED BACK PAIN LATERALITY: ICD-10-CM

## 2021-09-28 DIAGNOSIS — M54.42 CHRONIC LOW BACK PAIN WITH BILATERAL SCIATICA, UNSPECIFIED BACK PAIN LATERALITY: ICD-10-CM

## 2021-09-28 PROCEDURE — 99495 TRANSJ CARE MGMT MOD F2F 14D: CPT | Performed by: PHYSICIAN ASSISTANT

## 2021-09-28 RX ORDER — GABAPENTIN 100 MG/1
100 CAPSULE ORAL 3 TIMES DAILY
Qty: 90 CAPSULE | Refills: 1 | Status: SHIPPED | OUTPATIENT
Start: 2021-09-28 | End: 2021-11-15

## 2021-09-28 NOTE — PROGRESS NOTES
Assessment/Plan:   Patient Instructions     Assessment/plan:  1  Cellulitis-this seems to be improving  Patient finished outpatient Keflex therapy  He does have a slight amount of baseline edema on the left greater than right from history of prior trauma to the lower extremity  He is to keep an eye for signs of worsening infection  2   Leukocytosis -recommend reassessing white count  3  Elevated CRP- will reassess and make sure it returns to baseline  4   Anemia- hemoglobin was 12 in the hospital   Will reassess  5   Parkinson's disease -stable  Recommend follow-up with Neurology  6   Low back pain- patient reports Celebrex is not helping  We will try stopping this and switching to gabapentin 100 mg 3 times daily as needed  Patient was cautioned of drowsiness with this medication  No problem-specific Assessment & Plan notes found for this encounter  Diagnoses and all orders for this visit:    Parkinson's disease ([facility] [address] )    Leukocytosis, unspecified type  -     CBC and differential  -     Comprehensive metabolic panel  -     NT-BNP PRO; Future  -     C-reactive protein; Future    Anemia, unspecified type  -     CBC and differential  -     Comprehensive metabolic panel  -     NT-BNP PRO; Future  -     C-reactive protein; Future    Chronic low back pain with bilateral sciatica, unspecified back pain laterality  -     gabapentin (NEURONTIN) 100 mg capsule; Take 1 capsule (100 mg total) by mouth 3 (three) times a day         Subjective:     Patient ID: Hillary Mcintyre is a 73 y o  male  HPI:  This is a 68-year-old gentleman that presents to the office after recent hospitalization for encephalopathy and cellulitis  He is feeling better and states that his leg is not painful, red, or swollen  He does have a history of injury to this leg and has some baseline edema normally greater than the right side  He did finish outpatient antibiotics, Keflex which were given    While in the hospital his white count was elevated he was a bit anemic, and his C reactive protein was quite high  He also notes that he has severe back pain and Celebrex given to him at his last visit does not seem to be helping  He does have a history of CVA and continues aspirin 325 mg daily  Review of Systems   Constitutional: Negative for chills, fatigue and fever  HENT: Negative for congestion, ear pain and sinus pressure  Eyes: Negative for visual disturbance  Respiratory: Negative for cough, chest tightness and shortness of breath  Cardiovascular: Negative for chest pain and palpitations  Gastrointestinal: Negative for diarrhea, nausea and vomiting  Endocrine: Negative for polyuria  Genitourinary: Negative for dysuria and frequency  Musculoskeletal: Negative for arthralgias and myalgias  Skin: Negative for pallor and rash  Neurological: Negative for dizziness, weakness, light-headedness, numbness and headaches  Psychiatric/Behavioral: Negative for agitation, behavioral problems and sleep disturbance  All other systems reviewed and are negative  Objective:     Physical Exam  Vitals and nursing note reviewed  Constitutional:       General: He is not in acute distress  Appearance: He is well-developed  HENT:      Head: Normocephalic and atraumatic  Right Ear: External ear normal       Left Ear: External ear normal       Nose: Nose normal       Mouth/Throat:      Pharynx: No oropharyngeal exudate  Eyes:      Conjunctiva/sclera: Conjunctivae normal       Pupils: Pupils are equal, round, and reactive to light  Neck:      Thyroid: No thyromegaly  Trachea: No tracheal deviation  Cardiovascular:      Rate and Rhythm: Normal rate and regular rhythm  Heart sounds: Normal heart sounds  No murmur heard  No friction rub  Pulmonary:      Effort: Pulmonary effort is normal  No respiratory distress  Breath sounds: Normal breath sounds  No wheezing or rales     Abdominal: General: Bowel sounds are normal  There is no distension  Palpations: Abdomen is soft  Tenderness: There is no abdominal tenderness  There is no guarding or rebound  Musculoskeletal:         General: Swelling present  No tenderness  Normal range of motion  Cervical back: Normal range of motion and neck supple  Comments: There is trace edema of the left lower extremity, greater than the right side  There is no erythema or tenderness to palpation  Lymphadenopathy:      Cervical: No cervical adenopathy  Skin:     General: Skin is warm and dry  Findings: No erythema or rash  Neurological:      Mental Status: He is alert and oriented to person, place, and time  Cranial Nerves: No cranial nerve deficit  Coordination: Coordination normal    Psychiatric:         Behavior: Behavior normal          Thought Content: Thought content normal            Vitals:    09/28/21 1407   BP: 110/70   BP Location: Left arm   Patient Position: Sitting   Cuff Size: Large   Pulse: 76   Temp: 98 1 °F (36 7 °C)   TempSrc: Oral   Weight: 89 8 kg (198 lb)   Height: 5' 8" (1 727 m)       Transitional Care Management Review:  Bryant Aguilera is a 73 y o  male here for TCM follow up  During the TCM phone call patient stated:    TCM Call (since 8/28/2021)     Date and time call was made  9/20/2021  9:53 AM    Hospital care reviewed  Records reviewed    Patient was hospitial[facility] at  [facility]        Date of Admission  09/15/21    Date of discharge  09/17/21    Diagnosis  Encephalopathy, cellulitis, left lef redness    Disposition  Home    Were the patients medications reviewed and updated  No    Current Symptoms  None      TCM Call (since 8/28/2021)     Post hospital issues  None    Should patient be enrolled in anticoag monitoring? Yes    Scheduled for follow up?   Yes    Did you obtain your prescribed medications  Yes    Do you need help managing your prescriptions or medications  No Is transportation to your appointment needed  No    I have advised the patient to call PCP with any new or worsening symptoms  Mey Sarmiento, Practice Administrator           Angela Kwong PA-C

## 2021-09-28 NOTE — PATIENT INSTRUCTIONS
Assessment/plan:  1  Cellulitis-this seems to be improving  Patient finished outpatient Keflex therapy  He does have a slight amount of baseline edema on the left greater than right from history of prior trauma to the lower extremity  He is to keep an eye for signs of worsening infection  2   Leukocytosis -recommend reassessing white count  3  Elevated CRP- will reassess and make sure it returns to baseline  4   Anemia- hemoglobin was 12 in the hospital   Will reassess  5   Parkinson's disease -stable  Recommend follow-up with Neurology  6   Low back pain- patient reports Celebrex is not helping  We will try stopping this and switching to gabapentin 100 mg 3 times daily as needed  Patient was cautioned of drowsiness with this medication

## 2021-09-29 ENCOUNTER — OFFICE VISIT (OUTPATIENT)
Dept: PHYSICAL THERAPY | Facility: REHABILITATION | Age: 71
End: 2021-09-29
Payer: MEDICARE

## 2021-09-29 DIAGNOSIS — G20 PARKINSON'S DISEASE (HCC): Primary | ICD-10-CM

## 2021-09-29 PROCEDURE — 97112 NEUROMUSCULAR REEDUCATION: CPT | Performed by: PHYSICAL THERAPIST

## 2021-09-29 NOTE — PROGRESS NOTES
Daily Note     Today's date: 2021  Patient name: Rafa Ross  : 1950  MRN: [id number]  Referring provider: Megan Fagan MD  Dx:   Encounter Diagnosis     ICD-10-CM    1  Parkinson's disease ([facility] [address] )  G20        Subjective: He has been doing well he came from the grocery store with his friend  Started new medication for back pain, his back is feeling a little better on the new medication  Objective: See treatment diary below      Assessment: As he fatigues he tends to shuffle his feet more, educated about needing to take bigger steps during these times to prevent a fall  With cueing he is able to increase his stepping amplitude  Demonstrates delayed and shortened posterior stepping strategy throughout reactive balance training while having a decent lateral stepping strategy during walking with perturbations  Plan: Continue per plan of care  Continue with reactive balance training, larger amplitude movements, and gait quality throughout future sessions  Precautions: low back pain       Manuals 9/9 9/13 9/15/21  9/29/21                                   Neuro Re-Ed        STS  With big lean fwd, arms overhead in standing  12x  Treadmill 1 1 mph, both hands, moderate cueing to maintain pace with treadmill     Solostep 400 feet with 360 degree turns  2' cone Side steps in solo 25 feet 6 laps  6' hurdles 25 feet, 5 laps   Cone weaves in solostep 3 min, 25 feet laps    In ll bars, side steps over airex 4 min   In ll bars, step ups 8' 2x 1 min trials    Biodex treadmill, 1 1-1 3 mph use of one hand with cueing for larger steps       Solostep 400 feet with 360 degree turns     Solostep with 360 turns 100 feet timed trials:  - 42s, 37s, 43s, 41s  2' Cone step overs, 6 laps    Incline step ups, 3 min fwd/bwd  Posterior perturbation training, 2 min   Slip training with slip  fwd/bwd/lat, 10 min   Biodex treadmill 1 1, cueing ot larger amplitude steps to be more upright, 8 min     Following spt for stops, starts, 180 and 360 degree turns, 6 min     8 laps of 360 and 180 degree turns, emphasis on large amplitude movements    Incline step up and back, 3 min   Incline lateral step up and down, 2 min each side     Walking while responding to perturbations, 5 min      Turns  Walk turn around cone 20x   Walk turn around cone 20x    Backwards walk 20ft x8       Floor ladder                                 Ther Ex        Scifit  L1 5min warm up Heel raises off turf 3 x 45 seconds  Sci fit Lv 2, 7 min at end of session, cueing to increase speed Sci fit lv 4, 8 min at end of session  Scifit L2 5min  Sci fit lv 2, 8 min    Side stepping band OTB 2 laps in //bars                                                       Ther Activity                        Gait Training        Overground  With and w/o metronome (88bpm) cues for big steps 20min    5 min with and w/o metronome 88bpm cues for big steps  Biodex Treadmill     1  2mph with biofeedback and metronome 2min x2    Modalities

## 2021-10-04 ENCOUNTER — APPOINTMENT (OUTPATIENT)
Dept: LAB | Facility: HOSPITAL | Age: 71
End: 2021-10-04
Payer: MEDICARE

## 2021-10-04 ENCOUNTER — OFFICE VISIT (OUTPATIENT)
Dept: PHYSICAL THERAPY | Facility: REHABILITATION | Age: 71
End: 2021-10-04
Payer: MEDICARE

## 2021-10-04 DIAGNOSIS — F33.41 RECURRENT MAJOR DEPRESSIVE DISORDER, IN PARTIAL REMISSION (HCC): ICD-10-CM

## 2021-10-04 DIAGNOSIS — E78.2 MIXED HYPERLIPIDEMIA: ICD-10-CM

## 2021-10-04 DIAGNOSIS — R41.3 MEMORY CHANGES: ICD-10-CM

## 2021-10-04 DIAGNOSIS — Z12.5 SCREENING FOR PROSTATE CANCER: ICD-10-CM

## 2021-10-04 DIAGNOSIS — F16.21: ICD-10-CM

## 2021-10-04 DIAGNOSIS — R44.1 VISUAL HALLUCINATIONS: ICD-10-CM

## 2021-10-04 DIAGNOSIS — I10 ESSENTIAL HYPERTENSION: ICD-10-CM

## 2021-10-04 DIAGNOSIS — G20 PARKINSON'S DISEASE (HCC): Primary | ICD-10-CM

## 2021-10-04 DIAGNOSIS — Z12.11 SCREENING FOR COLON CANCER: ICD-10-CM

## 2021-10-04 DIAGNOSIS — I67.9 CEREBROVASCULAR DISEASE: ICD-10-CM

## 2021-10-04 DIAGNOSIS — G20 PARKINSON'S DISEASE (HCC): ICD-10-CM

## 2021-10-04 DIAGNOSIS — F10.21 ALCOHOLISM IN REMISSION (HCC): ICD-10-CM

## 2021-10-04 DIAGNOSIS — R94.01 ABNORMAL EEG: ICD-10-CM

## 2021-10-04 DIAGNOSIS — D64.9 ANEMIA, UNSPECIFIED TYPE: ICD-10-CM

## 2021-10-04 DIAGNOSIS — D72.829 LEUKOCYTOSIS, UNSPECIFIED TYPE: ICD-10-CM

## 2021-10-04 DIAGNOSIS — R73.9 HYPERGLYCEMIA: ICD-10-CM

## 2021-10-04 LAB
ALBUMIN SERPL BCP-MCNC: 3.5 G/DL (ref 3.5–5)
ALP SERPL-CCNC: 55 U/L (ref 46–116)
ALT SERPL W P-5'-P-CCNC: 23 U/L (ref 12–78)
ANION GAP SERPL CALCULATED.3IONS-SCNC: 9 MMOL/L (ref 4–13)
AST SERPL W P-5'-P-CCNC: 18 U/L (ref 5–45)
BASOPHILS # BLD AUTO: 0.05 THOUSANDS/ΜL (ref 0–0.1)
BASOPHILS NFR BLD AUTO: 1 % (ref 0–1)
BILIRUB SERPL-MCNC: 0.37 MG/DL (ref 0.2–1)
BILIRUB UR QL STRIP: NEGATIVE
BUN SERPL-MCNC: 26 MG/DL (ref 5–25)
CALCIUM SERPL-MCNC: 8.8 MG/DL (ref 8.3–10.1)
CHLORIDE SERPL-SCNC: 102 MMOL/L (ref 100–108)
CHOLEST SERPL-MCNC: 153 MG/DL (ref 50–200)
CLARITY UR: CLEAR
CO2 SERPL-SCNC: 27 MMOL/L (ref 21–32)
COLOR UR: YELLOW
CREAT SERPL-MCNC: 0.79 MG/DL (ref 0.6–1.3)
CRP SERPL QL: <3 MG/L
EOSINOPHIL # BLD AUTO: 0.36 THOUSAND/ΜL (ref 0–0.61)
EOSINOPHIL NFR BLD AUTO: 4 % (ref 0–6)
ERYTHROCYTE [DISTWIDTH] IN BLOOD BY AUTOMATED COUNT: 12 % (ref 11.6–15.1)
EST. AVERAGE GLUCOSE BLD GHB EST-MCNC: 126 MG/DL
GFR SERPL CREATININE-BSD FRML MDRD: 92 ML/MIN/1.73SQ M
GLUCOSE P FAST SERPL-MCNC: 94 MG/DL (ref 65–99)
GLUCOSE UR STRIP-MCNC: NEGATIVE MG/DL
HBA1C MFR BLD: 6 %
HCT VFR BLD AUTO: 37.7 % (ref 36.5–49.3)
HDLC SERPL-MCNC: 59 MG/DL
HGB BLD-MCNC: 12.5 G/DL (ref 12–17)
HGB UR QL STRIP.AUTO: NEGATIVE
IMM GRANULOCYTES # BLD AUTO: 0.02 THOUSAND/UL (ref 0–0.2)
IMM GRANULOCYTES NFR BLD AUTO: 0 % (ref 0–2)
KETONES UR STRIP-MCNC: NEGATIVE MG/DL
LDLC SERPL CALC-MCNC: 54 MG/DL (ref 0–100)
LEUKOCYTE ESTERASE UR QL STRIP: NEGATIVE
LYMPHOCYTES # BLD AUTO: 2.73 THOUSANDS/ΜL (ref 0.6–4.47)
LYMPHOCYTES NFR BLD AUTO: 33 % (ref 14–44)
MCH RBC QN AUTO: 32.1 PG (ref 26.8–34.3)
MCHC RBC AUTO-ENTMCNC: 33.2 G/DL (ref 31.4–37.4)
MCV RBC AUTO: 97 FL (ref 82–98)
MONOCYTES # BLD AUTO: 0.76 THOUSAND/ΜL (ref 0.17–1.22)
MONOCYTES NFR BLD AUTO: 9 % (ref 4–12)
NEUTROPHILS # BLD AUTO: 4.45 THOUSANDS/ΜL (ref 1.85–7.62)
NEUTS SEG NFR BLD AUTO: 53 % (ref 43–75)
NITRITE UR QL STRIP: NEGATIVE
NRBC BLD AUTO-RTO: 0 /100 WBCS
NT-PROBNP SERPL-MCNC: 41 PG/ML
PH UR STRIP.AUTO: 5.5 [PH]
PLATELET # BLD AUTO: 244 THOUSANDS/UL (ref 149–390)
PMV BLD AUTO: 9.7 FL (ref 8.9–12.7)
POTASSIUM SERPL-SCNC: 4.1 MMOL/L (ref 3.5–5.3)
PROT SERPL-MCNC: 7.2 G/DL (ref 6.4–8.2)
PROT UR STRIP-MCNC: NEGATIVE MG/DL
PSA SERPL-MCNC: 1 NG/ML (ref 0–4)
RBC # BLD AUTO: 3.89 MILLION/UL (ref 3.88–5.62)
SODIUM SERPL-SCNC: 138 MMOL/L (ref 136–145)
SP GR UR STRIP.AUTO: >=1.03 (ref 1–1.03)
TRIGL SERPL-MCNC: 202 MG/DL
TSH SERPL DL<=0.05 MIU/L-ACNC: 1.23 UIU/ML (ref 0.36–3.74)
UROBILINOGEN UR QL STRIP.AUTO: 0.2 E.U./DL
WBC # BLD AUTO: 8.37 THOUSAND/UL (ref 4.31–10.16)

## 2021-10-04 PROCEDURE — 80061 LIPID PANEL: CPT

## 2021-10-04 PROCEDURE — 36415 COLL VENOUS BLD VENIPUNCTURE: CPT | Performed by: PHYSICIAN ASSISTANT

## 2021-10-04 PROCEDURE — G0103 PSA SCREENING: HCPCS

## 2021-10-04 PROCEDURE — 84443 ASSAY THYROID STIM HORMONE: CPT

## 2021-10-04 PROCEDURE — 86140 C-REACTIVE PROTEIN: CPT

## 2021-10-04 PROCEDURE — 83036 HEMOGLOBIN GLYCOSYLATED A1C: CPT

## 2021-10-04 PROCEDURE — 83880 ASSAY OF NATRIURETIC PEPTIDE: CPT

## 2021-10-04 PROCEDURE — 80053 COMPREHEN METABOLIC PANEL: CPT | Performed by: PHYSICIAN ASSISTANT

## 2021-10-04 PROCEDURE — 85025 COMPLETE CBC W/AUTO DIFF WBC: CPT | Performed by: PHYSICIAN ASSISTANT

## 2021-10-04 PROCEDURE — 97110 THERAPEUTIC EXERCISES: CPT | Performed by: PHYSICAL THERAPIST

## 2021-10-04 PROCEDURE — 97112 NEUROMUSCULAR REEDUCATION: CPT | Performed by: PHYSICAL THERAPIST

## 2021-10-04 PROCEDURE — 81003 URINALYSIS AUTO W/O SCOPE: CPT

## 2021-10-06 ENCOUNTER — TELEPHONE (OUTPATIENT)
Dept: NEUROLOGY | Facility: CLINIC | Age: 71
End: 2021-10-06

## 2021-10-06 ENCOUNTER — OFFICE VISIT (OUTPATIENT)
Dept: PSYCHIATRY | Facility: CLINIC | Age: 71
End: 2021-10-06
Payer: MEDICARE

## 2021-10-06 ENCOUNTER — OFFICE VISIT (OUTPATIENT)
Dept: PHYSICAL THERAPY | Facility: REHABILITATION | Age: 71
End: 2021-10-06
Payer: MEDICARE

## 2021-10-06 DIAGNOSIS — G20 PARKINSON'S DISEASE (HCC): Primary | ICD-10-CM

## 2021-10-06 DIAGNOSIS — F33.41 RECURRENT MAJOR DEPRESSIVE DISORDER, IN PARTIAL REMISSION (HCC): ICD-10-CM

## 2021-10-06 DIAGNOSIS — K11.7 SIALORRHEA: Primary | ICD-10-CM

## 2021-10-06 PROCEDURE — 99213 OFFICE O/P EST LOW 20 MIN: CPT | Performed by: PSYCHIATRY & NEUROLOGY

## 2021-10-06 PROCEDURE — 97110 THERAPEUTIC EXERCISES: CPT | Performed by: PHYSICAL THERAPIST

## 2021-10-06 PROCEDURE — 97112 NEUROMUSCULAR REEDUCATION: CPT | Performed by: PHYSICAL THERAPIST

## 2021-10-07 ENCOUNTER — TELEPHONE (OUTPATIENT)
Dept: NEUROLOGY | Facility: CLINIC | Age: 71
End: 2021-10-07

## 2021-10-11 ENCOUNTER — OFFICE VISIT (OUTPATIENT)
Dept: PHYSICAL THERAPY | Facility: REHABILITATION | Age: 71
End: 2021-10-11
Payer: MEDICARE

## 2021-10-11 DIAGNOSIS — G20 PARKINSON'S DISEASE (HCC): Primary | ICD-10-CM

## 2021-10-11 PROCEDURE — 97112 NEUROMUSCULAR REEDUCATION: CPT | Performed by: PHYSICAL THERAPIST

## 2021-10-11 PROCEDURE — 97110 THERAPEUTIC EXERCISES: CPT | Performed by: PHYSICAL THERAPIST

## 2021-10-13 ENCOUNTER — OFFICE VISIT (OUTPATIENT)
Dept: PHYSICAL THERAPY | Facility: REHABILITATION | Age: 71
End: 2021-10-13
Payer: MEDICARE

## 2021-10-13 DIAGNOSIS — G20 PARKINSON'S DISEASE (HCC): Primary | ICD-10-CM

## 2021-10-13 PROCEDURE — 97112 NEUROMUSCULAR REEDUCATION: CPT | Performed by: PHYSICAL THERAPIST

## 2021-10-19 ENCOUNTER — OFFICE VISIT (OUTPATIENT)
Dept: PHYSICAL THERAPY | Facility: REHABILITATION | Age: 71
End: 2021-10-19
Payer: MEDICARE

## 2021-10-19 ENCOUNTER — TELEPHONE (OUTPATIENT)
Dept: FAMILY MEDICINE CLINIC | Facility: CLINIC | Age: 71
End: 2021-10-19

## 2021-10-19 DIAGNOSIS — G20 PARKINSON'S DISEASE (HCC): Primary | ICD-10-CM

## 2021-10-19 PROCEDURE — 97112 NEUROMUSCULAR REEDUCATION: CPT | Performed by: PHYSICAL THERAPIST

## 2021-10-19 PROCEDURE — 97110 THERAPEUTIC EXERCISES: CPT | Performed by: PHYSICAL THERAPIST

## 2021-10-21 ENCOUNTER — OFFICE VISIT (OUTPATIENT)
Dept: PHYSICAL THERAPY | Facility: REHABILITATION | Age: 71
End: 2021-10-21
Payer: MEDICARE

## 2021-10-21 DIAGNOSIS — G20 PARKINSON'S DISEASE (HCC): Primary | ICD-10-CM

## 2021-10-21 PROCEDURE — 97110 THERAPEUTIC EXERCISES: CPT | Performed by: PHYSICAL THERAPIST

## 2021-10-21 PROCEDURE — 97112 NEUROMUSCULAR REEDUCATION: CPT | Performed by: PHYSICAL THERAPIST

## 2021-10-25 ENCOUNTER — OFFICE VISIT (OUTPATIENT)
Dept: PHYSICAL THERAPY | Facility: REHABILITATION | Age: 71
End: 2021-10-25
Payer: MEDICARE

## 2021-10-25 DIAGNOSIS — G20 PARKINSON'S DISEASE (HCC): Primary | ICD-10-CM

## 2021-10-25 PROCEDURE — 97112 NEUROMUSCULAR REEDUCATION: CPT | Performed by: PHYSICAL THERAPIST

## 2021-10-25 PROCEDURE — 97110 THERAPEUTIC EXERCISES: CPT | Performed by: PHYSICAL THERAPIST

## 2021-10-26 ENCOUNTER — TELEPHONE (OUTPATIENT)
Dept: FAMILY MEDICINE CLINIC | Facility: CLINIC | Age: 71
End: 2021-10-26

## 2021-10-27 ENCOUNTER — OFFICE VISIT (OUTPATIENT)
Dept: PHYSICAL THERAPY | Facility: REHABILITATION | Age: 71
End: 2021-10-27
Payer: MEDICARE

## 2021-10-27 DIAGNOSIS — G20 PARKINSON'S DISEASE (HCC): Primary | ICD-10-CM

## 2021-10-27 PROCEDURE — 97112 NEUROMUSCULAR REEDUCATION: CPT | Performed by: PHYSICAL THERAPIST

## 2021-10-27 PROCEDURE — 97116 GAIT TRAINING THERAPY: CPT | Performed by: PHYSICAL THERAPIST

## 2021-10-28 ENCOUNTER — TELEPHONE (OUTPATIENT)
Dept: NEUROLOGY | Facility: CLINIC | Age: 71
End: 2021-10-28

## 2021-10-28 DIAGNOSIS — K11.7 SIALORRHEA: Primary | ICD-10-CM

## 2021-11-03 ENCOUNTER — OFFICE VISIT (OUTPATIENT)
Dept: PHYSICAL THERAPY | Facility: REHABILITATION | Age: 71
End: 2021-11-03
Payer: MEDICARE

## 2021-11-03 DIAGNOSIS — G20 PARKINSON'S DISEASE (HCC): Primary | ICD-10-CM

## 2021-11-03 PROCEDURE — 97110 THERAPEUTIC EXERCISES: CPT | Performed by: PHYSICAL THERAPIST

## 2021-11-03 PROCEDURE — 97112 NEUROMUSCULAR REEDUCATION: CPT | Performed by: PHYSICAL THERAPIST

## 2021-11-04 ENCOUNTER — OFFICE VISIT (OUTPATIENT)
Dept: PHYSICAL THERAPY | Facility: REHABILITATION | Age: 71
End: 2021-11-04
Payer: MEDICARE

## 2021-11-04 DIAGNOSIS — G20 PARKINSON'S DISEASE (HCC): Primary | ICD-10-CM

## 2021-11-04 PROCEDURE — 97110 THERAPEUTIC EXERCISES: CPT | Performed by: PHYSICAL THERAPIST

## 2021-11-04 PROCEDURE — 97112 NEUROMUSCULAR REEDUCATION: CPT | Performed by: PHYSICAL THERAPIST

## 2021-11-08 ENCOUNTER — OFFICE VISIT (OUTPATIENT)
Dept: PHYSICAL THERAPY | Facility: REHABILITATION | Age: 71
End: 2021-11-08
Payer: MEDICARE

## 2021-11-08 DIAGNOSIS — G20 PARKINSON'S DISEASE (HCC): Primary | ICD-10-CM

## 2021-11-08 PROCEDURE — 97110 THERAPEUTIC EXERCISES: CPT | Performed by: PHYSICAL THERAPIST

## 2021-11-08 PROCEDURE — 97112 NEUROMUSCULAR REEDUCATION: CPT | Performed by: PHYSICAL THERAPIST

## 2021-11-10 ENCOUNTER — OFFICE VISIT (OUTPATIENT)
Dept: PHYSICAL THERAPY | Facility: REHABILITATION | Age: 71
End: 2021-11-10
Payer: MEDICARE

## 2021-11-10 DIAGNOSIS — G20 PARKINSON'S DISEASE (HCC): Primary | ICD-10-CM

## 2021-11-10 PROCEDURE — 97112 NEUROMUSCULAR REEDUCATION: CPT | Performed by: PHYSICAL THERAPIST

## 2021-11-11 ENCOUNTER — OFFICE VISIT (OUTPATIENT)
Dept: FAMILY MEDICINE CLINIC | Facility: CLINIC | Age: 71
End: 2021-11-11
Payer: MEDICARE

## 2021-11-11 VITALS
SYSTOLIC BLOOD PRESSURE: 128 MMHG | HEIGHT: 68 IN | TEMPERATURE: 98.5 F | BODY MASS INDEX: 30.96 KG/M2 | DIASTOLIC BLOOD PRESSURE: 78 MMHG | WEIGHT: 204.25 LBS

## 2021-11-11 DIAGNOSIS — I10 ESSENTIAL HYPERTENSION: Primary | ICD-10-CM

## 2021-11-11 DIAGNOSIS — F16.21: ICD-10-CM

## 2021-11-11 DIAGNOSIS — F33.41 RECURRENT MAJOR DEPRESSIVE DISORDER, IN PARTIAL REMISSION (HCC): ICD-10-CM

## 2021-11-11 DIAGNOSIS — G20 PARKINSON'S DISEASE (HCC): ICD-10-CM

## 2021-11-11 DIAGNOSIS — I67.9 CEREBROVASCULAR DISEASE: ICD-10-CM

## 2021-11-11 DIAGNOSIS — R73.9 HYPERGLYCEMIA: ICD-10-CM

## 2021-11-11 DIAGNOSIS — F10.21 ALCOHOLISM IN REMISSION (HCC): ICD-10-CM

## 2021-11-11 DIAGNOSIS — R41.3 MEMORY CHANGES: ICD-10-CM

## 2021-11-11 DIAGNOSIS — Z00.00 HEALTH CARE MAINTENANCE: ICD-10-CM

## 2021-11-11 DIAGNOSIS — Z79.82 LONG-TERM USE OF ASPIRIN THERAPY: ICD-10-CM

## 2021-11-11 DIAGNOSIS — E78.2 MIXED HYPERLIPIDEMIA: ICD-10-CM

## 2021-11-11 PROCEDURE — 99214 OFFICE O/P EST MOD 30 MIN: CPT | Performed by: FAMILY MEDICINE

## 2021-11-11 PROCEDURE — G0439 PPPS, SUBSEQ VISIT: HCPCS | Performed by: FAMILY MEDICINE

## 2021-11-11 PROCEDURE — 1123F ACP DISCUSS/DSCN MKR DOCD: CPT | Performed by: FAMILY MEDICINE

## 2021-11-14 DIAGNOSIS — G89.29 CHRONIC LOW BACK PAIN WITH BILATERAL SCIATICA, UNSPECIFIED BACK PAIN LATERALITY: ICD-10-CM

## 2021-11-14 DIAGNOSIS — M54.41 CHRONIC LOW BACK PAIN WITH BILATERAL SCIATICA, UNSPECIFIED BACK PAIN LATERALITY: ICD-10-CM

## 2021-11-14 DIAGNOSIS — M54.42 CHRONIC LOW BACK PAIN WITH BILATERAL SCIATICA, UNSPECIFIED BACK PAIN LATERALITY: ICD-10-CM

## 2021-11-15 ENCOUNTER — OFFICE VISIT (OUTPATIENT)
Dept: PHYSICAL THERAPY | Facility: REHABILITATION | Age: 71
End: 2021-11-15
Payer: MEDICARE

## 2021-11-15 DIAGNOSIS — G20 PARKINSON'S DISEASE (HCC): Primary | ICD-10-CM

## 2021-11-15 PROCEDURE — 97112 NEUROMUSCULAR REEDUCATION: CPT | Performed by: PHYSICAL THERAPIST

## 2021-11-15 PROCEDURE — 97110 THERAPEUTIC EXERCISES: CPT | Performed by: PHYSICAL THERAPIST

## 2021-11-15 RX ORDER — GABAPENTIN 100 MG/1
CAPSULE ORAL
Qty: 90 CAPSULE | Refills: 1 | Status: SHIPPED | OUTPATIENT
Start: 2021-11-15 | End: 2022-02-04 | Stop reason: SDUPTHER

## 2021-11-17 ENCOUNTER — OFFICE VISIT (OUTPATIENT)
Dept: PHYSICAL THERAPY | Facility: REHABILITATION | Age: 71
End: 2021-11-17
Payer: MEDICARE

## 2021-11-17 DIAGNOSIS — G20 PARKINSON'S DISEASE (HCC): Primary | ICD-10-CM

## 2021-11-17 PROCEDURE — 97112 NEUROMUSCULAR REEDUCATION: CPT | Performed by: PHYSICAL THERAPIST

## 2021-11-22 ENCOUNTER — OFFICE VISIT (OUTPATIENT)
Dept: PHYSICAL THERAPY | Facility: REHABILITATION | Age: 71
End: 2021-11-22
Payer: MEDICARE

## 2021-11-22 ENCOUNTER — TELEPHONE (OUTPATIENT)
Dept: FAMILY MEDICINE CLINIC | Facility: CLINIC | Age: 71
End: 2021-11-22

## 2021-11-22 ENCOUNTER — OFFICE VISIT (OUTPATIENT)
Dept: OCCUPATIONAL THERAPY | Facility: REHABILITATION | Age: 71
End: 2021-11-22
Payer: MEDICARE

## 2021-11-22 DIAGNOSIS — G20 PARKINSON'S DISEASE (HCC): Primary | ICD-10-CM

## 2021-11-22 PROCEDURE — 97112 NEUROMUSCULAR REEDUCATION: CPT | Performed by: PHYSICAL THERAPIST

## 2021-11-22 PROCEDURE — 97166 OT EVAL MOD COMPLEX 45 MIN: CPT | Performed by: OCCUPATIONAL THERAPIST

## 2021-11-22 PROCEDURE — 96125 COGNITIVE TEST BY HC PRO: CPT | Performed by: OCCUPATIONAL THERAPIST

## 2021-11-24 ENCOUNTER — OFFICE VISIT (OUTPATIENT)
Dept: PHYSICAL THERAPY | Facility: REHABILITATION | Age: 71
End: 2021-11-24
Payer: MEDICARE

## 2021-11-24 DIAGNOSIS — G20 PARKINSON'S DISEASE (HCC): Primary | ICD-10-CM

## 2021-11-24 PROCEDURE — 97112 NEUROMUSCULAR REEDUCATION: CPT | Performed by: PHYSICAL THERAPIST

## 2021-11-29 ENCOUNTER — TELEPHONE (OUTPATIENT)
Dept: FAMILY MEDICINE CLINIC | Facility: CLINIC | Age: 71
End: 2021-11-29

## 2021-11-29 ENCOUNTER — OFFICE VISIT (OUTPATIENT)
Dept: PHYSICAL THERAPY | Facility: REHABILITATION | Age: 71
End: 2021-11-29
Payer: MEDICARE

## 2021-11-29 DIAGNOSIS — G20 PARKINSON'S DISEASE (HCC): Primary | ICD-10-CM

## 2021-11-29 PROCEDURE — 97112 NEUROMUSCULAR REEDUCATION: CPT | Performed by: PHYSICAL THERAPIST

## 2021-11-29 PROCEDURE — 97110 THERAPEUTIC EXERCISES: CPT | Performed by: PHYSICAL THERAPIST

## 2021-11-30 ENCOUNTER — TELEPHONE (OUTPATIENT)
Dept: FAMILY MEDICINE CLINIC | Facility: CLINIC | Age: 71
End: 2021-11-30

## 2021-12-01 ENCOUNTER — OFFICE VISIT (OUTPATIENT)
Dept: PHYSICAL THERAPY | Facility: REHABILITATION | Age: 71
End: 2021-12-01
Payer: MEDICARE

## 2021-12-01 DIAGNOSIS — G20 PARKINSON'S DISEASE (HCC): Primary | ICD-10-CM

## 2021-12-01 PROCEDURE — 97110 THERAPEUTIC EXERCISES: CPT | Performed by: PHYSICAL THERAPIST

## 2021-12-01 PROCEDURE — 97112 NEUROMUSCULAR REEDUCATION: CPT | Performed by: PHYSICAL THERAPIST

## 2021-12-06 ENCOUNTER — OFFICE VISIT (OUTPATIENT)
Dept: PHYSICAL THERAPY | Facility: REHABILITATION | Age: 71
End: 2021-12-06
Payer: MEDICARE

## 2021-12-06 DIAGNOSIS — G20 PARKINSON'S DISEASE (HCC): Primary | ICD-10-CM

## 2021-12-06 PROCEDURE — 97112 NEUROMUSCULAR REEDUCATION: CPT | Performed by: PHYSICAL THERAPIST

## 2021-12-06 PROCEDURE — 97110 THERAPEUTIC EXERCISES: CPT | Performed by: PHYSICAL THERAPIST

## 2021-12-08 ENCOUNTER — OFFICE VISIT (OUTPATIENT)
Dept: PHYSICAL THERAPY | Facility: REHABILITATION | Age: 71
End: 2021-12-08
Payer: MEDICARE

## 2021-12-08 DIAGNOSIS — G20 PARKINSON'S DISEASE (HCC): Primary | ICD-10-CM

## 2021-12-08 PROCEDURE — 97112 NEUROMUSCULAR REEDUCATION: CPT | Performed by: PHYSICAL THERAPIST

## 2021-12-08 PROCEDURE — 97110 THERAPEUTIC EXERCISES: CPT | Performed by: PHYSICAL THERAPIST

## 2021-12-13 ENCOUNTER — OFFICE VISIT (OUTPATIENT)
Dept: PHYSICAL THERAPY | Facility: REHABILITATION | Age: 71
End: 2021-12-13
Payer: MEDICARE

## 2021-12-13 DIAGNOSIS — G20 PARKINSON'S DISEASE (HCC): Primary | ICD-10-CM

## 2021-12-13 PROCEDURE — 97110 THERAPEUTIC EXERCISES: CPT | Performed by: PHYSICAL THERAPIST

## 2021-12-13 PROCEDURE — 97112 NEUROMUSCULAR REEDUCATION: CPT | Performed by: PHYSICAL THERAPIST

## 2021-12-15 ENCOUNTER — OFFICE VISIT (OUTPATIENT)
Dept: PHYSICAL THERAPY | Facility: REHABILITATION | Age: 71
End: 2021-12-15
Payer: MEDICARE

## 2021-12-15 DIAGNOSIS — G20 PARKINSON'S DISEASE (HCC): Primary | ICD-10-CM

## 2021-12-15 PROCEDURE — 97112 NEUROMUSCULAR REEDUCATION: CPT | Performed by: PHYSICAL THERAPIST

## 2021-12-15 PROCEDURE — 97110 THERAPEUTIC EXERCISES: CPT | Performed by: PHYSICAL THERAPIST

## 2021-12-20 ENCOUNTER — OFFICE VISIT (OUTPATIENT)
Dept: PHYSICAL THERAPY | Facility: REHABILITATION | Age: 71
End: 2021-12-20
Payer: MEDICARE

## 2021-12-20 DIAGNOSIS — G20 PARKINSON'S DISEASE (HCC): Primary | ICD-10-CM

## 2021-12-20 PROCEDURE — 97112 NEUROMUSCULAR REEDUCATION: CPT | Performed by: PHYSICAL THERAPIST

## 2021-12-20 PROCEDURE — 97110 THERAPEUTIC EXERCISES: CPT | Performed by: PHYSICAL THERAPIST

## 2021-12-22 ENCOUNTER — OFFICE VISIT (OUTPATIENT)
Dept: PHYSICAL THERAPY | Facility: REHABILITATION | Age: 71
End: 2021-12-22
Payer: MEDICARE

## 2021-12-22 DIAGNOSIS — G20 PARKINSON'S DISEASE (HCC): Primary | ICD-10-CM

## 2021-12-22 PROCEDURE — 97110 THERAPEUTIC EXERCISES: CPT | Performed by: PHYSICAL THERAPIST

## 2021-12-22 PROCEDURE — 97112 NEUROMUSCULAR REEDUCATION: CPT | Performed by: PHYSICAL THERAPIST

## 2022-01-07 ENCOUNTER — TELEPHONE (OUTPATIENT)
Dept: FAMILY MEDICINE CLINIC | Facility: CLINIC | Age: 72
End: 2022-01-07

## 2022-01-07 NOTE — TELEPHONE ENCOUNTER
PATIENT CALLING TO CHECK IF WE HAVE RECEIVED FORMS FROM [facility] ABOUT HIS DRIVING? HE IS TRYING TO GET BACK TO [employer]IVING [employer]  PATIENT WAS VERY CONFUSED WHEN WE SPOKE ON THE PHONE AND I HAD A HARD TIME UNDERSTANDING WHAT HE SAID  I INFORMED PATIENT THAT WE HAVE NOT RECEIVED ANY NEW FORMS FROM [facility]  PATIENT STATES HE DID NOT GO FOR DRIVING TEST AT [facility]  FEELS HE DOESN'T NEED [facility]   PLEASE CALL PATIENT TO CLARIFY [phone]

## 2022-01-08 NOTE — TELEPHONE ENCOUNTER
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

## 2022-01-12 DIAGNOSIS — G20 PARKINSON'S DISEASE (HCC): ICD-10-CM

## 2022-01-12 NOTE — TELEPHONE ENCOUNTER
Pt's dtr Isaiah Saeed requesting sinemet refill, 90 day supply be sent to [facility] pharmacy on file    Rx entered   Pls review and sing off sign off

## 2022-01-23 ENCOUNTER — PATIENT MESSAGE (OUTPATIENT)
Dept: FAMILY MEDICINE CLINIC | Facility: CLINIC | Age: 72
End: 2022-01-23

## 2022-01-23 DIAGNOSIS — I10 ESSENTIAL HYPERTENSION: ICD-10-CM

## 2022-01-24 RX ORDER — METOPROLOL SUCCINATE 50 MG/1
50 TABLET, EXTENDED RELEASE ORAL DAILY
Qty: 90 TABLET | Refills: 0 | Status: SHIPPED | OUTPATIENT
Start: 2022-01-24

## 2022-02-04 DIAGNOSIS — M54.42 CHRONIC LOW BACK PAIN WITH BILATERAL SCIATICA, UNSPECIFIED BACK PAIN LATERALITY: ICD-10-CM

## 2022-02-04 DIAGNOSIS — M54.41 CHRONIC LOW BACK PAIN WITH BILATERAL SCIATICA, UNSPECIFIED BACK PAIN LATERALITY: ICD-10-CM

## 2022-02-04 DIAGNOSIS — G89.29 CHRONIC LOW BACK PAIN WITH BILATERAL SCIATICA, UNSPECIFIED BACK PAIN LATERALITY: ICD-10-CM

## 2022-02-06 DIAGNOSIS — E78.2 MIXED HYPERLIPIDEMIA: ICD-10-CM

## 2022-02-07 RX ORDER — GABAPENTIN 100 MG/1
100 CAPSULE ORAL 3 TIMES DAILY
Qty: 90 CAPSULE | Refills: 0 | Status: SHIPPED | OUTPATIENT
Start: 2022-02-07 | End: 2022-03-02

## 2022-02-07 RX ORDER — ROSUVASTATIN CALCIUM 5 MG/1
5 TABLET, COATED ORAL DAILY
Qty: 30 TABLET | Refills: 5 | Status: SHIPPED | OUTPATIENT
Start: 2022-02-07

## 2022-03-02 DIAGNOSIS — M54.41 CHRONIC LOW BACK PAIN WITH BILATERAL SCIATICA, UNSPECIFIED BACK PAIN LATERALITY: ICD-10-CM

## 2022-03-02 DIAGNOSIS — M54.42 CHRONIC LOW BACK PAIN WITH BILATERAL SCIATICA, UNSPECIFIED BACK PAIN LATERALITY: ICD-10-CM

## 2022-03-02 DIAGNOSIS — G89.29 CHRONIC LOW BACK PAIN WITH BILATERAL SCIATICA, UNSPECIFIED BACK PAIN LATERALITY: ICD-10-CM

## 2022-03-02 RX ORDER — GABAPENTIN 100 MG/1
CAPSULE ORAL
Qty: 90 CAPSULE | Refills: 0 | Status: SHIPPED | OUTPATIENT
Start: 2022-03-02 | End: 2022-03-31

## 2022-03-31 DIAGNOSIS — M54.42 CHRONIC LOW BACK PAIN WITH BILATERAL SCIATICA, UNSPECIFIED BACK PAIN LATERALITY: ICD-10-CM

## 2022-03-31 DIAGNOSIS — M54.41 CHRONIC LOW BACK PAIN WITH BILATERAL SCIATICA, UNSPECIFIED BACK PAIN LATERALITY: ICD-10-CM

## 2022-03-31 DIAGNOSIS — G89.29 CHRONIC LOW BACK PAIN WITH BILATERAL SCIATICA, UNSPECIFIED BACK PAIN LATERALITY: ICD-10-CM

## 2022-03-31 RX ORDER — GABAPENTIN 100 MG/1
CAPSULE ORAL
Qty: 90 CAPSULE | Refills: 0 | Status: SHIPPED | OUTPATIENT
Start: 2022-03-31

## 2022-03-31 NOTE — TELEPHONE ENCOUNTER
I did renew patient's gabapentin however please ascertain from this patient he told me is moving out of the area when I saw him in November, is he still a patient of ours or did he movement is a seeing another PCP if he moved he needs to obtain another PCP inl the next 30 days

## 2022-05-03 ENCOUNTER — TELEPHONE (OUTPATIENT)
Dept: FAMILY MEDICINE CLINIC | Facility: CLINIC | Age: 72
End: 2022-05-03

## 2022-05-03 NOTE — TELEPHONE ENCOUNTER
On 2/19/2022 patient established care with [facility] [place], [place]   Brenda Castro    [phone] Jordyn Christine   [phone] (Fax)

## 2022-06-23 NOTE — TELEPHONE ENCOUNTER
06/23/22 8:10 AM        Thank you for your request  Your request has been received, reviewed, and the patient chart updated  The PCP has successfully been removed with a patient attribution note  This message will now be completed          Thank you  Cas Chambers

## 2022-08-10 DIAGNOSIS — E78.2 MIXED HYPERLIPIDEMIA: ICD-10-CM

## 2022-08-10 RX ORDER — ROSUVASTATIN CALCIUM 5 MG/1
TABLET, COATED ORAL
Qty: 30 TABLET | Refills: 5 | OUTPATIENT
Start: 2022-08-10

## 2022-08-19 NOTE — TELEPHONE ENCOUNTER
Pt daughter called in   He is no longer a patient of our, pt has established care at a new PCP closer to his daughter   Thank you

## 2022-10-12 PROBLEM — Z12.11 SCREENING FOR COLON CANCER: Status: RESOLVED | Noted: 2020-07-06 | Resolved: 2022-10-12
